# Patient Record
Sex: FEMALE | Race: WHITE | Employment: FULL TIME | URBAN - METROPOLITAN AREA
[De-identification: names, ages, dates, MRNs, and addresses within clinical notes are randomized per-mention and may not be internally consistent; named-entity substitution may affect disease eponyms.]

---

## 2017-08-18 ENCOUNTER — HOSPITAL ENCOUNTER (OUTPATIENT)
Dept: MAMMOGRAPHY | Age: 47
Discharge: HOME | End: 2017-08-18
Attending: FAMILY MEDICINE
Payer: COMMERCIAL

## 2017-08-18 DIAGNOSIS — Z12.31 ENCOUNTER FOR SCREENING MAMMOGRAM FOR BREAST CANCER: ICD-10-CM

## 2017-08-18 PROCEDURE — 77067 SCR MAMMO BI INCL CAD: CPT

## 2017-08-30 PROBLEM — R22.2 MASS ON BACK: Status: ACTIVE | Noted: 2017-08-30

## 2017-08-30 PROBLEM — R22.2 PALPABLE MASS OF LOWER BACK: Status: ACTIVE | Noted: 2017-08-30

## 2017-08-30 RX ORDER — VANCOMYCIN/0.9 % SOD CHLORIDE 1.5G/250ML
1500 PLASTIC BAG, INJECTION (ML) INTRAVENOUS
Status: CANCELLED | OUTPATIENT
Start: 2017-10-17

## 2017-10-13 RX ORDER — VANCOMYCIN 2 GRAM/500 ML IN 0.9 % SODIUM CHLORIDE INTRAVENOUS
2000 ONCE
Status: CANCELLED | OUTPATIENT
Start: 2017-10-17 | End: 2017-10-17

## 2017-12-11 ENCOUNTER — ANESTHESIA EVENT (OUTPATIENT)
Dept: SURGERY | Age: 47
End: 2017-12-11
Payer: COMMERCIAL

## 2017-12-11 RX ORDER — VANCOMYCIN/0.9 % SOD CHLORIDE 1.5G/250ML
1500 PLASTIC BAG, INJECTION (ML) INTRAVENOUS ONCE
Status: COMPLETED | OUTPATIENT
Start: 2017-12-12 | End: 2017-12-12

## 2017-12-11 NOTE — ANESTHESIA PREPROCEDURE EVALUATION
Anesthetic History               Review of Systems / Medical History  Patient summary reviewed and pertinent labs reviewed    Pulmonary        Sleep apnea: No treatment    Asthma : well controlled       Neuro/Psych              Cardiovascular  Within defined limits                Exercise tolerance: >4 METS     GI/Hepatic/Renal  Within defined limits              Endo/Other    Diabetes: well controlled, type 2    Morbid obesity     Other Findings            Physical Exam    Airway  Mallampati: II  TM Distance: 4 - 6 cm  Neck ROM: normal range of motion   Mouth opening: Normal     Cardiovascular  Regular rate and rhythm,  S1 and S2 normal,  no murmur, click, rub, or gallop             Dental  No notable dental hx       Pulmonary  Breath sounds clear to auscultation               Abdominal  GI exam deferred       Other Findings            Anesthetic Plan    ASA: 3  Anesthesia type: general          Induction: Intravenous  Anesthetic plan and risks discussed with: Spouse and Patient

## 2017-12-12 ENCOUNTER — ANESTHESIA (OUTPATIENT)
Dept: SURGERY | Age: 47
End: 2017-12-12
Payer: COMMERCIAL

## 2017-12-12 ENCOUNTER — HOSPITAL ENCOUNTER (OUTPATIENT)
Age: 47
Setting detail: OUTPATIENT SURGERY
Discharge: HOME | End: 2017-12-12
Attending: SURGERY | Admitting: SURGERY
Payer: COMMERCIAL

## 2017-12-12 VITALS
HEART RATE: 50 BPM | OXYGEN SATURATION: 96 % | DIASTOLIC BLOOD PRESSURE: 83 MMHG | TEMPERATURE: 98.6 F | RESPIRATION RATE: 18 BRPM | BODY MASS INDEX: 41.02 KG/M2 | HEIGHT: 71 IN | SYSTOLIC BLOOD PRESSURE: 134 MMHG | WEIGHT: 293 LBS

## 2017-12-12 LAB — GLUCOSE BLD STRIP.AUTO-MCNC: 93 MG/DL (ref 65–100)

## 2017-12-12 PROCEDURE — 77030008467 HC STPLR SKN COVD -B: Performed by: SURGERY

## 2017-12-12 PROCEDURE — 77030018836 HC SOL IRR NACL ICUM -A: Performed by: SURGERY

## 2017-12-12 PROCEDURE — 76210000020 HC REC RM PH II FIRST 0.5 HR: Performed by: SURGERY

## 2017-12-12 PROCEDURE — 76060000032 HC ANESTHESIA 0.5 TO 1 HR: Performed by: SURGERY

## 2017-12-12 PROCEDURE — 88305 TISSUE EXAM BY PATHOLOGIST: CPT | Performed by: SURGERY

## 2017-12-12 PROCEDURE — 74011250636 HC RX REV CODE- 250/636

## 2017-12-12 PROCEDURE — 74011250636 HC RX REV CODE- 250/636: Performed by: SURGERY

## 2017-12-12 PROCEDURE — 74011250637 HC RX REV CODE- 250/637: Performed by: ANESTHESIOLOGY

## 2017-12-12 PROCEDURE — 77030020782 HC GWN BAIR PAWS FLX 3M -B: Performed by: NURSE ANESTHETIST, CERTIFIED REGISTERED

## 2017-12-12 PROCEDURE — 77030003029 HC SUT VCRL J&J -B: Performed by: SURGERY

## 2017-12-12 PROCEDURE — 77030011640 HC PAD GRND REM COVD -A: Performed by: SURGERY

## 2017-12-12 PROCEDURE — 74011000250 HC RX REV CODE- 250: Performed by: SURGERY

## 2017-12-12 PROCEDURE — 82962 GLUCOSE BLOOD TEST: CPT

## 2017-12-12 PROCEDURE — 74011000250 HC RX REV CODE- 250

## 2017-12-12 PROCEDURE — 77030008703 HC TU ET UNCUF COVD -A: Performed by: NURSE ANESTHETIST, CERTIFIED REGISTERED

## 2017-12-12 PROCEDURE — 76010000138 HC OR TIME 0.5 TO 1 HR: Performed by: SURGERY

## 2017-12-12 PROCEDURE — 74011250636 HC RX REV CODE- 250/636: Performed by: ANESTHESIOLOGY

## 2017-12-12 PROCEDURE — 77030033269 HC SLV COMPR SCD KNE2 CARD -B: Performed by: SURGERY

## 2017-12-12 PROCEDURE — 76210000006 HC OR PH I REC 0.5 TO 1 HR: Performed by: SURGERY

## 2017-12-12 RX ORDER — MIDAZOLAM HYDROCHLORIDE 1 MG/ML
2 INJECTION, SOLUTION INTRAMUSCULAR; INTRAVENOUS
Status: DISCONTINUED | OUTPATIENT
Start: 2017-12-12 | End: 2017-12-12 | Stop reason: HOSPADM

## 2017-12-12 RX ORDER — FENTANYL CITRATE 50 UG/ML
INJECTION, SOLUTION INTRAMUSCULAR; INTRAVENOUS AS NEEDED
Status: DISCONTINUED | OUTPATIENT
Start: 2017-12-12 | End: 2017-12-12 | Stop reason: HOSPADM

## 2017-12-12 RX ORDER — ONDANSETRON 2 MG/ML
INJECTION INTRAMUSCULAR; INTRAVENOUS AS NEEDED
Status: DISCONTINUED | OUTPATIENT
Start: 2017-12-12 | End: 2017-12-12 | Stop reason: HOSPADM

## 2017-12-12 RX ORDER — LIDOCAINE HYDROCHLORIDE 20 MG/ML
INJECTION, SOLUTION EPIDURAL; INFILTRATION; INTRACAUDAL; PERINEURAL AS NEEDED
Status: DISCONTINUED | OUTPATIENT
Start: 2017-12-12 | End: 2017-12-12 | Stop reason: HOSPADM

## 2017-12-12 RX ORDER — OXYCODONE HYDROCHLORIDE 5 MG/1
5 TABLET ORAL
Status: DISCONTINUED | OUTPATIENT
Start: 2017-12-12 | End: 2017-12-12 | Stop reason: HOSPADM

## 2017-12-12 RX ORDER — DEXAMETHASONE SODIUM PHOSPHATE 4 MG/ML
INJECTION, SOLUTION INTRA-ARTICULAR; INTRALESIONAL; INTRAMUSCULAR; INTRAVENOUS; SOFT TISSUE AS NEEDED
Status: DISCONTINUED | OUTPATIENT
Start: 2017-12-12 | End: 2017-12-12 | Stop reason: HOSPADM

## 2017-12-12 RX ORDER — HYDROCODONE BITARTRATE AND ACETAMINOPHEN 5; 325 MG/1; MG/1
2 TABLET ORAL AS NEEDED
Status: DISCONTINUED | OUTPATIENT
Start: 2017-12-12 | End: 2017-12-12 | Stop reason: HOSPADM

## 2017-12-12 RX ORDER — LIDOCAINE HYDROCHLORIDE 10 MG/ML
0.1 INJECTION INFILTRATION; PERINEURAL AS NEEDED
Status: DISCONTINUED | OUTPATIENT
Start: 2017-12-12 | End: 2017-12-12 | Stop reason: HOSPADM

## 2017-12-12 RX ORDER — ROCURONIUM BROMIDE 10 MG/ML
INJECTION, SOLUTION INTRAVENOUS AS NEEDED
Status: DISCONTINUED | OUTPATIENT
Start: 2017-12-12 | End: 2017-12-12 | Stop reason: HOSPADM

## 2017-12-12 RX ORDER — GLYCOPYRROLATE 0.2 MG/ML
INJECTION INTRAMUSCULAR; INTRAVENOUS AS NEEDED
Status: DISCONTINUED | OUTPATIENT
Start: 2017-12-12 | End: 2017-12-12 | Stop reason: HOSPADM

## 2017-12-12 RX ORDER — NEOSTIGMINE METHYLSULFATE 1 MG/ML
INJECTION INTRAVENOUS AS NEEDED
Status: DISCONTINUED | OUTPATIENT
Start: 2017-12-12 | End: 2017-12-12 | Stop reason: HOSPADM

## 2017-12-12 RX ORDER — BUPIVACAINE HYDROCHLORIDE 2.5 MG/ML
INJECTION, SOLUTION EPIDURAL; INFILTRATION; INTRACAUDAL AS NEEDED
Status: DISCONTINUED | OUTPATIENT
Start: 2017-12-12 | End: 2017-12-12 | Stop reason: HOSPADM

## 2017-12-12 RX ORDER — PROPOFOL 10 MG/ML
INJECTION, EMULSION INTRAVENOUS AS NEEDED
Status: DISCONTINUED | OUTPATIENT
Start: 2017-12-12 | End: 2017-12-12 | Stop reason: HOSPADM

## 2017-12-12 RX ORDER — SODIUM CHLORIDE, SODIUM LACTATE, POTASSIUM CHLORIDE, CALCIUM CHLORIDE 600; 310; 30; 20 MG/100ML; MG/100ML; MG/100ML; MG/100ML
75 INJECTION, SOLUTION INTRAVENOUS CONTINUOUS
Status: DISCONTINUED | OUTPATIENT
Start: 2017-12-12 | End: 2017-12-12 | Stop reason: HOSPADM

## 2017-12-12 RX ORDER — HYDROMORPHONE HYDROCHLORIDE 2 MG/ML
0.5 INJECTION, SOLUTION INTRAMUSCULAR; INTRAVENOUS; SUBCUTANEOUS
Status: DISCONTINUED | OUTPATIENT
Start: 2017-12-12 | End: 2017-12-12 | Stop reason: HOSPADM

## 2017-12-12 RX ORDER — KETOROLAC TROMETHAMINE 30 MG/ML
INJECTION, SOLUTION INTRAMUSCULAR; INTRAVENOUS AS NEEDED
Status: DISCONTINUED | OUTPATIENT
Start: 2017-12-12 | End: 2017-12-12 | Stop reason: HOSPADM

## 2017-12-12 RX ORDER — MIDAZOLAM HYDROCHLORIDE 1 MG/ML
5 INJECTION, SOLUTION INTRAMUSCULAR; INTRAVENOUS ONCE
Status: DISCONTINUED | OUTPATIENT
Start: 2017-12-12 | End: 2017-12-12 | Stop reason: HOSPADM

## 2017-12-12 RX ORDER — FENTANYL CITRATE 50 UG/ML
100 INJECTION, SOLUTION INTRAMUSCULAR; INTRAVENOUS ONCE
Status: DISCONTINUED | OUTPATIENT
Start: 2017-12-12 | End: 2017-12-12 | Stop reason: HOSPADM

## 2017-12-12 RX ORDER — FAMOTIDINE 20 MG/1
20 TABLET, FILM COATED ORAL ONCE
Status: COMPLETED | OUTPATIENT
Start: 2017-12-12 | End: 2017-12-12

## 2017-12-12 RX ADMIN — KETOROLAC TROMETHAMINE 30 MG: 30 INJECTION, SOLUTION INTRAMUSCULAR; INTRAVENOUS at 07:37

## 2017-12-12 RX ADMIN — ONDANSETRON 4 MG: 2 INJECTION INTRAMUSCULAR; INTRAVENOUS at 07:37

## 2017-12-12 RX ADMIN — ROCURONIUM BROMIDE 35 MG: 10 INJECTION, SOLUTION INTRAVENOUS at 07:17

## 2017-12-12 RX ADMIN — PROPOFOL 200 MG: 10 INJECTION, EMULSION INTRAVENOUS at 07:17

## 2017-12-12 RX ADMIN — GLYCOPYRROLATE 0.3 MG: 0.2 INJECTION INTRAMUSCULAR; INTRAVENOUS at 07:51

## 2017-12-12 RX ADMIN — FENTANYL CITRATE 50 MCG: 50 INJECTION, SOLUTION INTRAMUSCULAR; INTRAVENOUS at 07:17

## 2017-12-12 RX ADMIN — LIDOCAINE HYDROCHLORIDE 100 MG: 20 INJECTION, SOLUTION EPIDURAL; INFILTRATION; INTRACAUDAL; PERINEURAL at 07:17

## 2017-12-12 RX ADMIN — FENTANYL CITRATE 50 MCG: 50 INJECTION, SOLUTION INTRAMUSCULAR; INTRAVENOUS at 07:30

## 2017-12-12 RX ADMIN — FAMOTIDINE 20 MG: 20 TABLET ORAL at 06:03

## 2017-12-12 RX ADMIN — DEXAMETHASONE SODIUM PHOSPHATE 4 MG: 4 INJECTION, SOLUTION INTRA-ARTICULAR; INTRALESIONAL; INTRAMUSCULAR; INTRAVENOUS; SOFT TISSUE at 07:37

## 2017-12-12 RX ADMIN — NEOSTIGMINE METHYLSULFATE 2 MG: 1 INJECTION INTRAVENOUS at 07:51

## 2017-12-12 RX ADMIN — SODIUM CHLORIDE, SODIUM LACTATE, POTASSIUM CHLORIDE, AND CALCIUM CHLORIDE 75 ML/HR: 600; 310; 30; 20 INJECTION, SOLUTION INTRAVENOUS at 06:00

## 2017-12-12 RX ADMIN — VANCOMYCIN HYDROCHLORIDE 1500 MG: 10 INJECTION, POWDER, LYOPHILIZED, FOR SOLUTION INTRAVENOUS at 06:00

## 2017-12-12 NOTE — DISCHARGE INSTRUCTIONS
Leave dressings 6-7 days. Then remove, but keep incision covered daily until follow-up. Try to keep incision as dry as possible to lower risk of infection. No heavy lifting (>5lbs) for 6 weeks to reduce risk of developing a hernia in the incisions. No driving until you are off pain meds for 24hrs and have no pain with movements associated with driving. After general anesthesia or intravenous sedation, for 24 hours or while taking prescription Narcotics:  · Limit your activities  · Do not drive and operate hazardous machinery  · Do not make important personal or business decisions  · Do  not drink alcoholic beverages  · If you have not urinated within 8 hours after discharge, please contact your surgeon on call. *  Please give a list of your current medications to your Primary Care Provider. *  Please update this list whenever your medications are discontinued, doses are      changed, or new medications (including over-the-counter products) are added. *  Please carry medication information at all times in case of emergency situations. These are general instructions for a healthy lifestyle:  No smoking/ No tobacco products/ Avoid exposure to second hand smoke  Surgeon General's Warning:  Quitting smoking now greatly reduces serious risk to your health. Obesity, smoking, and sedentary lifestyle greatly increases your risk for illness  A healthy diet, regular physical exercise & weight monitoring are important for maintaining a healthy lifestyle    You may be retaining fluid if you have a history of heart failure or if you experience any of the following symptoms:  Weight gain of 3 pounds or more overnight or 5 pounds in a week, increased swelling in our hands or feet or shortness of breath while lying flat in bed. Please call your doctor as soon as you notice any of these symptoms; do not wait until your next office visit.     Recognize signs and symptoms of STROKE:  F-face looks uneven  A-arms unable to move or move unevenly  S-speech slurred or non-existent  T-time-call 911 as soon as signs and symptoms begin-DO NOT go       Back to bed or wait to see if you get better-TIME IS BRAIN.     Follow-up with Dr Gaurav Cuenca on Thursday (12-) in the office at: 3:30 pm.  [facility]  , [address]  [phone]

## 2017-12-12 NOTE — BRIEF OP NOTE
BRIEF OPERATIVE NOTE    Date of Procedure:  12/12/2017    Preoperative Diagnosis: Benign tumor of back [D21.6]  Postoperative Diagnosis: same    Procedure:  Soft tissue mass excision form right upper back - 8cm  Surgeon(s) and Role:     * Dickson Padilla MD - Primary  Anesthesia: General  Estimated Blood Loss: <30cc  Specimens:   ID Type Source Tests Collected by Time Destination   1 : Right Upper Back Mass Preservative Back  Dickson Padilla MD 12/12/2017 6528 Pathology     Findings: see op note   Complications: none  Implants: * No implants in log *

## 2017-12-12 NOTE — IP AVS SNAPSHOT
[address] [address] 
[phone] Patient: Narciso Ghosh MRN: [id number] Yanet Ferreira About your hospitalization You were admitted on:  December 12, 2017 You last received care in the:  [facility] PACU You were discharged on:  December 12, 2017 Why you were hospitalized Your primary diagnosis was:  Not on File Things You Need To Do (next 8 weeks) Thursday Dec 21, 2017 Global Post Op with Dickson Padilla MD at  3:30 PM  
Where:  [facility] Discharge Orders None A check gary indicates which time of day the medication should be taken. My Medications TAKE these medications as instructed Instructions Each Dose to Equal  
 Morning Noon Evening Bedtime  
 albuterol 90 mcg/actuation inhaler Commonly known as:  PROAIR HFA Your last dose was: Your next dose is:    
   
   
 INHALE 2 TO 4 INHALATIONS EVERY 6 HOURS AS NEEDED FOR COUGH/WHEEZING/SOB  
     
   
   
   
  
 atorvastatin 10 mg tablet Commonly known as:  LIPITOR Your last dose was: Your next dose is: Take 1 Tab by mouth daily. 10 mg  
    
   
   
   
  
 dicyclomine 10 mg capsule Commonly known as:  BENTYL Your last dose was: Your next dose is:    
   
   
 1-2 po QID (before meals and bedtime) prn abdominal pain/ cramping  
     
   
   
   
  
 estradiol 0.5 mg tablet Commonly known as:  ESTRACE Your last dose was: Your next dose is: Take 1 Tab by mouth daily. 0.5 mg  
    
   
   
   
  
 fluticasone 50 mcg/actuation nasal spray Commonly known as:  [place] Willis Your last dose was: Your next dose is: 2 Sprays by Both Nostrils route daily. 2 Spray  
    
   
   
   
  
 fluticasone-vilanterol 100-25 mcg/dose inhaler Commonly known as:  BREO ELLIPTA Your last dose was: Your next dose is: Take 1 Puff by inhalation daily. 1 Puff  
    
   
   
   
  
 levocetirizine 5 mg tablet Commonly known as:  Lo Haley Your last dose was: Your next dose is: Take 1 Tab by mouth daily. Dx:J30.89.9 - failed allegra, claritin, zyrtec and flonase 5 mg  
    
   
   
   
  
 melatonin 1 mg tablet Your last dose was: Your next dose is: Take 1 mg by mouth nightly. Take 2 by mouth at bedtime. 1 mg  
    
   
   
   
  
 metFORMIN  mg tablet Commonly known as:  glucophage XR Your last dose was: Your next dose is:    
   
   
 2 po QD  
     
   
   
   
  
 nystatin powder Commonly known as:  NYSTOP Your last dose was: Your next dose is:    
   
   
 Apply  to affected area four (4) times daily. OTHER(NON-FORMULARY) Your last dose was: Your next dose is: CPAP mask hose and supplies - use nightly  DX: MATILDA Discharge Instructions Leave dressings 6-7 days. Then remove, but keep incision covered daily until follow-up. Try to keep incision as dry as possible to lower risk of infection. No heavy lifting (>5lbs) for 6 weeks to reduce risk of developing a hernia in the incisions. No driving until you are off pain meds for 24hrs and have no pain with movements associated with driving. After general anesthesia or intravenous sedation, for 24 hours or while taking prescription Narcotics: · Limit your activities · Do not drive and operate hazardous machinery · Do not make important personal or business decisions · Do  not drink alcoholic beverages · If you have not urinated within 8 hours after discharge, please contact your surgeon on call. *  Please give a list of your current medications to your Primary Care Provider. *  Please update this list whenever your medications are discontinued, doses are 
    changed, or new medications (including over-the-counter products) are added. *  Please carry medication information at all times in case of emergency situations. These are general instructions for a healthy lifestyle: No smoking/ No tobacco products/ Avoid exposure to second hand smoke Surgeon General's Warning:  Quitting smoking now greatly reduces serious risk to your health. Obesity, smoking, and sedentary lifestyle greatly increases your risk for illness A healthy diet, regular physical exercise & weight monitoring are important for maintaining a healthy lifestyle You may be retaining fluid if you have a history of heart failure or if you experience any of the following symptoms:  Weight gain of 3 pounds or more overnight or 5 pounds in a week, increased swelling in our hands or feet or shortness of breath while lying flat in bed. Please call your doctor as soon as you notice any of these symptoms; do not wait until your next office visit. Recognize signs and symptoms of STROKE: 
F-face looks uneven A-arms unable to move or move unevenly S-speech slurred or non-existent T-time-call 911 as soon as signs and symptoms begin-DO NOT go Back to bed or wait to see if you get better-TIME IS BRAIN. Follow-up with Dr Deanne Culver on Thursday (12-) in the office at: 3:30 pm. 
[facility] [address], [address] 
[phone] Introducing [facility] SERVICES! Dear Nohemy Sainz: 
Thank you for requesting a [employer] account. Our records indicate that you already have an active [employer] account. You can access your account anytime at [email or web address] Did you know that you can access your hospital and ER discharge instructions at any time in [employer]? You can also review all of your test results from your hospital stay or ER visit. Additional Information If you have questions, please visit the Frequently Asked Questions section of the [employer] website at [email or web address] Remember, MyChart is NOT to be used for urgent needs. For medical emergencies, dial 911. Now available from your iPhone and Android! Providers Seen During Your Hospitalization Provider Specialty Primary office phone Meaghan Serrano MD General Surgery [phone] Your Primary Care Physician (PCP) Primary Care Physician Office Phone Office Fax Esmeralda Schlatter [phone] You are allergic to the following Allergen Reactions Cephalexin Rash Recent Documentation Height Weight BMI OB Status Smoking Status 1.803 m 149.3 kg 45.91 kg/m2 Hysterectomy Former Smoker Emergency Contacts Name Discharge Info Relation Home Work Mobile Day,Volodymyr  Spouse [3] [phone] Patient Belongings The following personal items are in your possession at time of discharge: 
  Dental Appliances: None  Visual Aid: Glasses      Home Medications: None   Jewelry: Necklace  Clothing: Shirt, Pants, Footwear    Other Valuables: Eyeglasses Please provide this summary of care documentation to your next provider. Signatures-by signing, you are acknowledging that this After Visit Summary has been reviewed with you and you have received a copy. Patient Signature:  ____________________________________________________________ Date:  ____________________________________________________________  
  
Gail Sylvia Provider Signature:  ____________________________________________________________ Date:  ____________________________________________________________

## 2017-12-12 NOTE — H&P
H&P/Consult Note/Progress Note/Office Note:   Quincy Ruff  MRN: [id number]  :1970  Age:47 y.o.    HPI: Quincy Ruff is a 56 y.o. female who was referred for evaluation of soft tissue masses of her back. She reports a large, constant, growing soft tissue mass of her right upper back over many months and wants it removed. She reports no associated skin drainage. Nothing seems to make it better or worse. She has no prior infections of the area. She also reports a  unrelated mass of her right mid-lower back which is mall and hard to feel. It causes her a lot of pain and she also wants it removed. It is better when her  massages it an She has no h/o sarcoma. She has no associated weight loss. Past Medical History:   Diagnosis Date    Abnormal weight gain      44.7    Allergic rhinitis, cause unspecified 2013    Asthma     inhalers daily and prn    Diabetes ([address]     pre-diabetic- checks blood sugar twice a month - varies.  metformin    Dysmetabolic syndrome X 1148    Health care maintenance 2015    Colonoscopy -   na   Hemoccult cards - na   Pap smear -  s/p hysterectomy   DEXA scan - na Mammogram - 10/13 EKG -  Na Chest x-ray -  na Spirometry -  na HIV -   Hepatitis C -   Tetanus shot -  CPE  - 2015         Hypercholesteremia     Metabolic syndrome     Migraine     Miscarriage     Nausea alone 2012    Other malaise and fatigue 2012    Salpingo-oophoritis following molar or ectopic pregnancy     Shortness of breath 2012    on exertion r/t asthma    Sleep apnea     no c pap at present time    Spasm of muscle 2012     Past Surgical History:   Procedure Laterality Date    HX DARLEEN AND BSO  2006    Dr. Vaughn Alexander  as child     Current Facility-Administered Medications   Medication Dose Route Frequency    lidocaine (XYLOCAINE) 10 mg/mL (1 %) injection 0.1 mL  0.1 mL SubCUTAneous PRN    lactated Ringers infusion  75 mL/hr IntraVENous CONTINUOUS    fentaNYL citrate (PF) injection 100 mcg  100 mcg IntraVENous ONCE    midazolam (VERSED) injection 2 mg  2 mg IntraVENous ONCE PRN    midazolam (VERSED) injection 5 mg  5 mg IntraVENous ONCE    vancomycin (VANCOCIN) 1500 mg in  ml infusion  1,500 mg IntraVENous ONCE     Cephalexin  Social History     Social History    Marital status:      Spouse name: N/A    Number of children: N/A    Years of education: N/A     Occupational History     - [employer]     Social History Main Topics    Smoking status: Former Smoker     Packs/day: 0.20     Years: 4.00     Types: Cigarettes     Quit date: 1996    Smokeless tobacco: Never Used    Alcohol use 1.0 oz/week     2 Glasses of wine per week      Comment: 2 glasses / month    Drug use: No    Sexual activity: Not on file     Other Topics Concern    Blood Transfusions No    Caffeine Concern No     2 cups coffee, unsweet tea    Exercise Yes     walking    Seat Belt Yes     Social History Narrative     History   Smoking Status    Former Smoker    Packs/day: 0.20    Years: 4.00    Types: Cigarettes    Quit date: 1996   Smokeless Tobacco    Never Used     Family History   Problem Relation Age of Onset    Hypertension Brother     High Cholesterol Brother     Psychiatric Disorder Brother      Bi-Polar    Diabetes Mother     Diabetes Father      MI    Hypertension Father     Other Father      dvt    Heart Disease Father     Breast Cancer Neg Hx      ROS: The patient has no difficulty with chest pain or shortness of breath. No fever or chills. Comprehensive review of systems was otherwise unremarkable except as noted above.     Physical Exam:   Visit Vitals    /80 (BP 1 Location: Right arm, BP Patient Position: At rest)    Pulse 79    Temp 98.5 °F (36.9 °C)    Resp 18    Ht 5' 11\" (1.803 m)    Wt 329 lb 3 oz (149.3 kg)    SpO2 97%    BMI 45.91 kg/m2 Constitutional: Alert, oriented, cooperative patient in no acute distress; appears stated age    Eyes:Sclera are clear. EOMs intact  ENMT: no external lesions gross hearing normal; no obvious neck masses, no ear or lip lesions, nares normal  CV: RRR. Normal perfusion  Resp: No JVD. Breathing is  non-labored; no audible wheezing. GI: soft and non-distended    Large soft tissue mass of right upper back  We cannot palpate or find a second mass on 12/12/17  Prior in the office a possible smaller and difficult to feel nodular and tender soft tissue mass of right mid-lower back   Musculoskeletal: unremarkable with normal function. No embolic signs or cyanosis. Neuro:  Oriented; moves all 4; no focal deficits  Psychiatric: normal affect and mood, no memory impairment    Recent vitals (if inpt):  @IPVITALS(24:)@    Labs:  No results for input(s): WBC, HGB, PLT, NA, K, CL, CO2, BUN, CREA, GLU, PTP, INR, APTT, TBIL, TBILI, CBIL, SGOT, GPT, ALT, AP, AML, LPSE, LCAD, NH4, TROPT, TROIQ, PCO2, PO2, HCO3, HGBEXT, PLTEXT, HGBEXT, PLTEXT in the last 72 hours. No lab exists for component:  PH, INREXT, INREXT    Lab Results   Component Value Date/Time    WBC 6.4 06/22/2017 10:46 AM    HGB 14.5 06/22/2017 10:46 AM    PLATELET 009 70/28/8456 10:46 AM    Sodium 138 06/22/2017 10:46 AM    Potassium 4.9 06/22/2017 10:46 AM    Chloride 99 06/22/2017 10:46 AM    CO2 24 06/22/2017 10:46 AM    BUN 12 06/22/2017 10:46 AM    Creatinine 1.09 06/22/2017 10:46 AM    Glucose 92 06/22/2017 10:46 AM    Bilirubin, total 0.4 06/22/2017 10:46 AM    AST (SGOT) 24 06/22/2017 10:46 AM    ALT (SGPT) 30 06/22/2017 10:46 AM    Alk. phosphatase 102 06/22/2017 10:46 AM       I reviewed recent labs and recent radiologic studies. I independently reviewed radiology images for studies I described above or studies I have ordered.    Admission date (for inpatients): 12/12/2017   [unfilled]  [unfilled]    ASSESSMENT/PLAN:  Problem List  Date Reviewed: 8/30/2017          Codes Class Noted    Mass on back ICD-10-CM: R22.2  ICD-9-CM: 782.2  8/30/2017        Palpable mass of right mid-lower back ICD-10-CM: R22.2  ICD-9-CM: 782.2  8/30/2017        Chronic venous insufficiency ICD-10-CM: I87.2  ICD-9-CM: 459.81  4/20/2016    Overview Signed 6/22/2017  9:47 AM by Maximo Reese MD     Last Assessment & Plan:   See plan above under thrombophlebitis superficial veins of right lower extremity             Thrombophlebitis of superficial veins of right lower extremity ICD-10-CM: I80.01  ICD-9-CM: 451.0  4/20/2016    Overview Signed 6/22/2017  9:47 AM by Maximo Reese MD     Last Assessment & Plan:   Right calf-resolved  Duplex ultrasound, reflux, protocol demonstrates GSV incompetence below the knee. Tributary vein feeding the posterior lateral calf branches from the distal thigh GSV where the GSV is competent. Procedural intervention directed at the great saphenous vein is not recommended. Sclerotherapy is offered for the tributary vein and telangiectasias bilateral calf. She understands insurance may not consider this medically necessary. However, at this time she is improved and does not want to pursue injections. She was switched from a thigh high to a knee-high, class I. New prescription given. She'll continue to elevate her leg.  She will start a walking program. Horse chestnut seed recommended  Return to clinic in 6 months             Health care maintenance (Chronic) ICD-10-CM: Z00.00  ICD-9-CM: V70.0  2/4/2015    Overview Addendum 6/27/2017  2:02 PM by Maximo Reese MD     Colonoscopy -   na   Hemoccult cards - na   Pap smear -  s/p hysterectomy   DEXA scan - na Mammogram - 2015  EKG -  3/3016 Chest x-ray - 3/3016  na Spirometry -  2015 HIV -   Hepatitis C -   Tetanus shot - 9/12 CPE  - 2/4/2015     Eye MD - [facility]             Diabetes mellitus without complication ([facility] NGX-16-ZM: E11.9  ICD-9-CM: 250.00  6/26/2013        Allergic rhinitis ICD-10-CM: J30.9  ICD-9-CM: 477.9  5/21/2013        Shortness of breath (Chronic) ICD-10-CM: R06.02  ICD-9-CM: 786.05  9/27/2012        Fatigue (Chronic) ICD-10-CM: G63.81  ICD-9-CM: 780.79  9/27/2012    Overview Signed 1/26/2013  1:51 PM by Anna Estrella MD     The patient is stable on the current treatment. Tolerating well. No sides effects. Will not adjust at this time. Abnormal weight gain (Chronic) ICD-10-CM: R63.5  ICD-9-CM: 783.1  9/27/2012        Dysmetabolic syndrome X (Chronic) ICD-10-CM: N21.43  ICD-9-CM: 277.7  9/27/2012    Overview Signed 1/26/2013  1:52 PM by Anna Estrella MD     The patient is stable on the current treatment. Tolerating well. No sides effects. Will not adjust at this time. Disturbance in sleep behavior (Chronic) ICD-10-CM: G47.9  ICD-9-CM: 780.50  9/27/2012    Overview Signed 1/26/2013  1:51 PM by Anna Estrella MD     The patient is stable on the current treatment. Tolerating well. No sides effects. Will not adjust at this time. Spasm of muscle ICD-10-CM: S03.015  ICD-9-CM: 728.85  9/27/2012        Headache(784.0) (Chronic) ICD-10-CM: R51  ICD-9-CM: 784.0  9/27/2012    Overview Signed 1/26/2013  1:51 PM by Anna Estrella MD     The patient is stable on the current treatment. Tolerating well. No sides effects. Will not adjust at this time. Nausea alone ICD-10-CM: R11.0  ICD-9-CM: 787.02  9/27/2012        Acute sinusitis, unspecified ICD-10-CM: J01.90  ICD-9-CM: 461.9  9/27/2012            Active Problems:    * No active hospital problems. *     Informed consent obtained to excise the upper back mass and possibly a right paralumbar mass if it can be found with her prone (It was niot palpable to me, her , or patient in pre-op just before surgery).       Signed:  Julian Major MD,  FACS

## 2017-12-12 NOTE — ANESTHESIA POSTPROCEDURE EVALUATION
Post-Anesthesia Evaluation and Assessment    Patient: Xochilt Ruff MRN: [id number]  SSN: [id number]    YOB: 1970  Age: 56 y.o. Sex: female       Cardiovascular Function/Vital Signs  Visit Vitals    /84    Pulse (!) 53    Temp 36.6 °C (97.8 °F)    Resp 18    Ht 5' 11\" (1.803 m)    Wt 149.3 kg (329 lb 3 oz)    SpO2 96%    BMI 45.91 kg/m2       Patient is status post general anesthesia for Procedure(s):  EXCISION  X 2 OF BACK SOFT TISSUE MASS / PRONE POSITION. Nausea/Vomiting: None    Postoperative hydration reviewed and adequate. Pain:  Pain Scale 1: Visual (12/12/17 0803)  Pain Intensity 1: 0 (12/12/17 0803)   Managed    Neurological Status:   Neuro (WDL): Within Defined Limits (12/12/17 0803)   At baseline    Mental Status and Level of Consciousness: Arousable    Pulmonary Status:   O2 Device: Nasal cannula (12/12/17 0805)   Adequate oxygenation and airway patent    Complications related to anesthesia: None    Post-anesthesia assessment completed.  No concerns    Signed By: Denice Kirk MD     December 12, 2017

## 2017-12-19 NOTE — OP NOTES
[address] [facility]  OPERATIVE REPORT    Anna Gregg  MR#: [id number]  : 1970  ACCOUNT #: [de-identified]   DATE OF SERVICE: 2017    PREOPERATIVE DIAGNOSIS:  Soft tissue mass of upper back. POSTOPERATIVE DIAGNOSIS:  Soft tissue mass of upper back. PROCEDURES PERFORMED:  Excision of soft tissue mass of the upper back. SURGEON:  KUNAL Armas:  None. ANESTHESIA:  General.    COMPLICATIONS:  None. ESTIMATED BLOOD LOSS:  Less than 20 mL. SPECIMENS REMOVED:  The specimen was sent to Pathology for review. IMPLANTS:  None. DESCRIPTION OF PROCEDURE: This patient was taken to the operating room and placed in the supine position. After adequate anesthesia, the patient was placed prone. Her upper back was prepped and draped in a sterile fashion with multiple layers of Betadine scrub and Betadine solution. A longitudinal incision was made over the upper back mass. It was circumferentially excised completely from the subcutaneous space. It was about 8 cm in size. It was sent to Pathology for review. Irrigation was used. Hemostasis was confirmed. The biopsy cavity was palpated and inspected, and there was no evidence of residual disease. Interrupted deep dermal 3-0 Vicryl sutures were placed. The skin was closed with clips. A sterile dressing was placed. The patient tolerated the procedure well.       MD MARIA ELENA Ramirez / [place]  D: 12/15/2017 23:19     T: 2017 13:43  JOB #: [id number]

## 2017-12-21 PROBLEM — E66.01 OBESITY, MORBID (HCC): Status: ACTIVE | Noted: 2017-12-21

## 2019-01-31 ENCOUNTER — HOSPITAL ENCOUNTER (OUTPATIENT)
Dept: MAMMOGRAPHY | Age: 49
Discharge: HOME | End: 2019-01-31
Attending: FAMILY MEDICINE
Payer: COMMERCIAL

## 2019-01-31 DIAGNOSIS — Z12.39 SCREENING BREAST EXAMINATION: ICD-10-CM

## 2019-01-31 PROCEDURE — 77067 SCR MAMMO BI INCL CAD: CPT

## 2021-03-26 ENCOUNTER — HOSPITAL ENCOUNTER (OUTPATIENT)
Dept: MAMMOGRAPHY | Age: 51
Discharge: HOME | End: 2021-03-26

## 2021-03-26 DIAGNOSIS — Z12.31 VISIT FOR SCREENING MAMMOGRAM: ICD-10-CM

## 2022-03-19 PROBLEM — E66.01 OBESITY, MORBID (HCC): Status: ACTIVE | Noted: 2017-12-21

## 2022-03-19 PROBLEM — R22.2 MASS ON BACK: Status: ACTIVE | Noted: 2017-08-30

## 2022-06-22 ENCOUNTER — OFFICE VISIT (OUTPATIENT)
Dept: FAMILY MEDICINE CLINIC | Facility: CLINIC | Age: 52
End: 2022-06-22
Payer: COMMERCIAL

## 2022-06-22 VITALS
HEIGHT: 61 IN | OXYGEN SATURATION: 98 % | DIASTOLIC BLOOD PRESSURE: 80 MMHG | TEMPERATURE: 98.4 F | BODY MASS INDEX: 55.32 KG/M2 | RESPIRATION RATE: 16 BRPM | HEART RATE: 64 BPM | WEIGHT: 293 LBS | SYSTOLIC BLOOD PRESSURE: 126 MMHG

## 2022-06-22 DIAGNOSIS — E55.9 VITAMIN D DEFICIENCY: ICD-10-CM

## 2022-06-22 DIAGNOSIS — R63.5 WEIGHT GAIN: ICD-10-CM

## 2022-06-22 DIAGNOSIS — E66.01 OBESITY, MORBID (HCC): ICD-10-CM

## 2022-06-22 DIAGNOSIS — I10 ESSENTIAL HYPERTENSION: ICD-10-CM

## 2022-06-22 DIAGNOSIS — F41.1 GAD (GENERALIZED ANXIETY DISORDER): ICD-10-CM

## 2022-06-22 DIAGNOSIS — E11.9 DIABETES MELLITUS WITHOUT COMPLICATION (HCC): ICD-10-CM

## 2022-06-22 DIAGNOSIS — R53.83 FATIGUE, UNSPECIFIED TYPE: ICD-10-CM

## 2022-06-22 DIAGNOSIS — E88.81 DYSMETABOLIC SYNDROME X: ICD-10-CM

## 2022-06-22 DIAGNOSIS — Z23 NEED FOR SHINGLES VACCINE: ICD-10-CM

## 2022-06-22 DIAGNOSIS — N95.1 MENOPAUSAL STATE: ICD-10-CM

## 2022-06-22 DIAGNOSIS — Z12.11 SCREEN FOR COLON CANCER: ICD-10-CM

## 2022-06-22 DIAGNOSIS — M17.11 ARTHRITIS OF RIGHT KNEE: ICD-10-CM

## 2022-06-22 DIAGNOSIS — M25.552 LEFT HIP PAIN: ICD-10-CM

## 2022-06-22 DIAGNOSIS — E78.5 DYSLIPIDEMIA: ICD-10-CM

## 2022-06-22 DIAGNOSIS — R63.5 ABNORMAL WEIGHT GAIN: ICD-10-CM

## 2022-06-22 DIAGNOSIS — Z00.00 LABORATORY EXAM ORDERED AS PART OF ROUTINE GENERAL MEDICAL EXAMINATION: ICD-10-CM

## 2022-06-22 DIAGNOSIS — M79.18 LEFT BUTTOCK PAIN: ICD-10-CM

## 2022-06-22 DIAGNOSIS — J30.1 NON-SEASONAL ALLERGIC RHINITIS DUE TO POLLEN: ICD-10-CM

## 2022-06-22 DIAGNOSIS — Z00.00 PHYSICAL EXAM: Primary | ICD-10-CM

## 2022-06-22 LAB
BILIRUBIN, URINE, POC: NEGATIVE
BLOOD URINE, POC: NEGATIVE
CHOLEST SERPL-MCNC: 145 MG/DL
GLUCOSE URINE, POC: NEGATIVE
HDLC SERPL-MCNC: 48 MG/DL (ref 40–60)
HDLC SERPL: 3 {RATIO}
KETONES, URINE, POC: NEGATIVE
LDLC SERPL CALC-MCNC: 73 MG/DL
LEUKOCYTE ESTERASE, URINE, POC: NEGATIVE
NITRITE, URINE, POC: NEGATIVE
PH, URINE, POC: 6 (ref 4.6–8)
PROTEIN,URINE, POC: NEGATIVE
SPECIFIC GRAVITY, URINE, POC: 1 (ref 1–1.03)
TRIGL SERPL-MCNC: 120 MG/DL (ref 35–150)
TSH, 3RD GENERATION: 1.87 UIU/ML (ref 0.36–3.74)
URINALYSIS CLARITY, POC: CLEAR
URINALYSIS COLOR, POC: YELLOW
UROBILINOGEN, POC: 0.2
VLDLC SERPL CALC-MCNC: 24 MG/DL (ref 6–23)

## 2022-06-22 PROCEDURE — 99396 PREV VISIT EST AGE 40-64: CPT | Performed by: FAMILY MEDICINE

## 2022-06-22 PROCEDURE — 81003 URINALYSIS AUTO W/O SCOPE: CPT | Performed by: FAMILY MEDICINE

## 2022-06-22 PROCEDURE — 99214 OFFICE O/P EST MOD 30 MIN: CPT | Performed by: FAMILY MEDICINE

## 2022-06-22 RX ORDER — ATORVASTATIN CALCIUM 10 MG/1
TABLET, FILM COATED ORAL
COMMUNITY
Start: 2022-05-05 | End: 2022-06-22 | Stop reason: SDUPTHER

## 2022-06-22 RX ORDER — LEVOCETIRIZINE DIHYDROCHLORIDE 5 MG/1
5 TABLET, FILM COATED ORAL EVERY EVENING
COMMUNITY

## 2022-06-22 RX ORDER — FLUTICASONE PROPIONATE 50 MCG
1 SPRAY, SUSPENSION (ML) NASAL DAILY
COMMUNITY

## 2022-06-22 RX ORDER — FAMOTIDINE 20 MG/1
TABLET, FILM COATED ORAL
COMMUNITY

## 2022-06-22 RX ORDER — ESTRADIOL 0.5 MG/1
TABLET ORAL
COMMUNITY
Start: 2022-05-05 | End: 2022-06-22 | Stop reason: SDUPTHER

## 2022-06-22 RX ORDER — FLUTICASONE FUROATE AND VILANTEROL 100; 25 UG/1; UG/1
1 POWDER RESPIRATORY (INHALATION) DAILY
COMMUNITY

## 2022-06-22 RX ORDER — FLUOXETINE HYDROCHLORIDE 20 MG/1
20 CAPSULE ORAL DAILY
Qty: 90 CAPSULE | Refills: 1 | Status: SHIPPED | OUTPATIENT
Start: 2022-06-22

## 2022-06-22 RX ORDER — ZOSTER VACCINE RECOMBINANT, ADJUVANTED 50 MCG/0.5
0.5 KIT INTRAMUSCULAR SEE ADMIN INSTRUCTIONS
Qty: 0.5 ML | Refills: 1 | Status: SHIPPED | OUTPATIENT
Start: 2022-06-22 | End: 2022-12-19

## 2022-06-22 RX ORDER — ESTRADIOL 0.5 MG/1
TABLET ORAL
Qty: 90 TABLET | Refills: 3 | Status: SHIPPED | OUTPATIENT
Start: 2022-06-22

## 2022-06-22 RX ORDER — DICLOFENAC SODIUM 75 MG/1
TABLET, DELAYED RELEASE ORAL
Qty: 180 TABLET | Refills: 1 | Status: SHIPPED | OUTPATIENT
Start: 2022-06-22

## 2022-06-22 RX ORDER — MELATONIN 10 MG
CAPSULE ORAL
COMMUNITY

## 2022-06-22 RX ORDER — BISOPROLOL FUMARATE AND HYDROCHLOROTHIAZIDE 5; 6.25 MG/1; MG/1
TABLET ORAL
Qty: 90 TABLET | Refills: 1 | Status: SHIPPED | OUTPATIENT
Start: 2022-06-22

## 2022-06-22 RX ORDER — ATORVASTATIN CALCIUM 10 MG/1
TABLET, FILM COATED ORAL
Qty: 90 TABLET | Refills: 1 | Status: SHIPPED | OUTPATIENT
Start: 2022-06-22

## 2022-06-22 RX ORDER — ALBUTEROL SULFATE 90 UG/1
AEROSOL, METERED RESPIRATORY (INHALATION) EVERY 4 HOURS PRN
COMMUNITY

## 2022-06-22 RX ORDER — BISOPROLOL FUMARATE AND HYDROCHLOROTHIAZIDE 5; 6.25 MG/1; MG/1
TABLET ORAL
COMMUNITY
Start: 2022-05-05 | End: 2022-06-22 | Stop reason: SDUPTHER

## 2022-06-22 RX ORDER — PHENDIMETRAZINE TARTRATE 35 MG/1
TABLET ORAL
Qty: 42 TABLET | Refills: 0
Start: 2022-06-22 | End: 2022-09-05 | Stop reason: SDUPTHER

## 2022-06-22 ASSESSMENT — PATIENT HEALTH QUESTIONNAIRE - PHQ9
SUM OF ALL RESPONSES TO PHQ QUESTIONS 1-9: 0
SUM OF ALL RESPONSES TO PHQ QUESTIONS 1-9: 0
1. LITTLE INTEREST OR PLEASURE IN DOING THINGS: 0
SUM OF ALL RESPONSES TO PHQ QUESTIONS 1-9: 0
SUM OF ALL RESPONSES TO PHQ QUESTIONS 1-9: 0
2. FEELING DOWN, DEPRESSED OR HOPELESS: 0
SUM OF ALL RESPONSES TO PHQ9 QUESTIONS 1 & 2: 0

## 2022-06-22 NOTE — PROGRESS NOTES
HISTORY OF PRESENT ILLNESS  Chief Complaint   Patient presents with    Annual Exam     does not see gyn; FULL hysterectomy    Hip Pain     left hip; fell a few years ago and now this is starting to hurt daily      Fell at least 2 years ago when slipped on a deck and landed on the left hip. Was hurting every once on awhile and now hurting all the time over the last few weeks. Hurts from the left buttocks around to the medial left thigh  Tried biofreeze voltaren gel otc med every day  Ice  Some days are worse than others  If goes to twist will get a sharp pain. Trying to move around more in the daytime. Does get stiff when sitting. Has gained some weight over the last year. 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 Brother not helping. Distracted all the time by this. Has arthritis in the right knee. Has had injections in that knee before    Has been going to the weight loss clinic. Was on the adipex ubtil it stopped working. Diabetes Mellitus: Patient presents for follow up of diabetes. Symptoms: taking medications as instructed, no medication side effects noted, no TIA's, no chest pain on exertion, no dyspnea on exertion, no swelling of ankles. Symptoms have asymptomatic. Patient denies polydipsia and polyuria. Evaluation to date has been included below. Home sugars: BGs consistently in an acceptable range. Treatment to date: medications. Diabetic issues reviewed with her: low cholesterol diet, weight control and daily exercise discussed. Key Antihyperglycemic Medications     Patient is on no antihyperglycemic meds.          Lab Results   Component Value Date    LABA1C 5.4 06/22/2022     Lab Results   Component Value Date     06/22/2022      Lab Results   Component Value Date    CREATININE 0.90 06/22/2022      Lab Results   Component Value Date    LABMICR <0.50 06/22/2022      Wt Readings from Last 3 Encounters:   06/22/22 (!) 319 lb 3.2 oz (144.8 kg)   04/12/21 (!) 301 lb (136.5 kg)      BP Readings from Last 3 Encounters:   06/22/22 126/80   04/12/21 116/78        Hip Pain   The incident occurred more than 1 week ago. The injury mechanism was a fall. The pain is present in the left hip. The quality of the pain is described as aching and shooting. The pain is at a severity of 6/10. The pain has been fluctuating since onset. Pertinent negatives include no inability to bear weight, loss of motion, loss of sensation, muscle weakness, numbness or tingling. The symptoms are aggravated by movement and weight bearing. She has tried ice, NSAIDs, rest and acetaminophen for the symptoms. The treatment provided mild relief. Headache   Associated symptoms include headaches. Pertinent negatives include no chest pain, no abdominal pain and no shortness of breath. Cholesterol Problem   The history is provided by the patient. This is a chronic problem. The current episode started more than 1 week ago. The problem occurs daily. The problem has not changed since onset. Associated symptoms include headaches. Pertinent negatives include no chest pain, no abdominal pain and no shortness of breath. The symptoms are aggravated by eating. The symptoms are relieved by medications and walking. The treatment provided moderate relief. Review of Systems   Neurological: Negative for tingling and numbness. Complete Review Of Systems (Female)  General:  Normal Activity and decreased Energy Levels.    No change in appetite.  complains of  some gain or loss in weight. complains of Fatigue - stress related, no malaise, fever, chills or sweats. Eyes:           no complain of  changes in vision.  No eye problems.  Eye exam up to date - appt 7/22.  Wears glasses/ contacts.   ENT:   complaints of chronic sinus congestion or pain.  No nose bleeds, no complains of  hearing problems, tinnitis, hoarseness, or trouble swallowing.  No gum or teeth problems.  Regular dental care. Respiratory:           No recent URI.  no complains of coughing, wheezing, or occasionally shortness of breath.  No sputum.  No hemoptysis. Cardiac:  no chest pain  occasionally palpitations with anxiety orthopnea, no complains of dyspnea on exertion.  complains of  Edema - ankles and feet.  No varicose veins. GI:  No abdominal pain, nausea, vomiting, bloating, complains of diarrhea, constipation, blood, or change in bowels.    :  No frequency, urgency, dysuria,  or hematuria.  No Leakage or incontinence.  No sexual dysfunction complains of change in libido.  Normal menstrual history/  Surgical menopause. Peter Balder unusual vaginal bleeding,  discharge, or odor.  No pelvic pain. Musculoskeletal:  No muscle or joint pain.  No Injury.  No weakness, swelling or inflammation.  No decreased in ROM, muscle atrophy, or back pain.    See hpi - knee pain   Neuro:             No numbness, tingling, weakness, dizziness, tremor, or complains of headaches.  No Loss of consciousness or seizures.  No transient ischemic symptoms.  Still rarely migranes. Skin/ Breast:         no rash or non-healing skin lesions.  No breast pain, discharge or masses.  dry skin  Psych:  No sleep issues.  No increase in nervousness, mood swings, complains of  irritability or depression.  Coping well.  No thoughts of suicide or homicide. Using melatonin 4-5 times a week on work nites. Endocrine:  No trouble with excessive hunger or thirst or urination.  complains of heat and cold intolerance.  No thyroid issues.  No hot flashes. Heme/Lymphatic:  No anemia.  complains of  easy bruising or bleeding.  No enlarged or tender nodes. Allergy/ Immunologic: complains of  allergies to environmental or food substances.  No seasonal rhinitis.  No chronic problems with immune system. See HPI for further details.     Past Medical History:   Diagnosis Date    Abnormal weight gain      44.7    Allergic rhinitis, cause unspecified 2013    Arthritis     Asthma     inhalers daily and prn    Diabetes ([address]     pre-diabetic- checks blood sugar twice a month - varies.  metformin    Dysmetabolic syndrome X     Health care maintenance 2015    Colonoscopy -   na   Hemoccult cards - na   Pap smear -  s/p hysterectomy   DEXA scan ? na Mammogram ? 10/13 EKG -  Na Chest x-ray -  na Spirometry -  na HIV -   Hepatitis C -   Tetanus shot -  CPE  - 2015         Hypercholesteremia     Menopause     Metabolic syndrome     Migraine     Miscarriage     Nausea alone 2012    Other malaise and fatigue 2012    Salpingo-oophoritis following molar or ectopic pregnancy     Shortness of breath 2012    on exertion r/t asthma    Sleep apnea     no c pap at present time    Spasm of muscle 2012     Past Surgical History:   Procedure Laterality Date    ENID AND BSO (CERVIX REMOVED)      Dr. Fred Escalera  as child     Family History   Problem Relation Age of Onset    Hypertension Father     Diabetes Father         MI   Annie Garcia Breast Cancer Neg Hx     Heart Disease Father     Other Father         dvt    Psychiatric Disorder Brother         Bi-Polar    High Cholesterol Brother     Hypertension Brother     Diabetes Mother      Family Status   Relation Name Status    Father  Alive        DM, HTN, h/o DVT    Neg Hx  (Not Specified)   Annie Garcia Brother  Alive        bipolar, HTN, lipid d/o    Mother  Alive        DM, HTN, COPD, Neuropathy    PGF  (Not Specified)        pancreatic CA, DM      Social History     Socioeconomic History    Marital status:      Spouse name: Not on file    Number of children: Not on file    Years of education: Not on file    Highest education level: Not on file   Occupational History    Not on file   Tobacco Use    Smoking status: Former Smoker     Packs/day: 0.20     Quit date: 1996     Years since quittin.5    Smokeless tobacco: Never Used   Vaping Use    Vaping Use: Never used   Substance and Sexual Activity    Alcohol use: Yes     Alcohol/week: 1.7 standard drinks    Drug use: No    Sexual activity: Not on file   Other Topics Concern    Not on file   Social History Narrative    Not on file     Social Determinants of Health     Financial Resource Strain:     Difficulty of Paying Living Expenses: Not on file   Food Insecurity:     Worried About Running Out of Food in the Last Year: Not on file    Brent of Food in the Last Year: Not on file   Transportation Needs:     Lack of Transportation (Medical): Not on file    Lack of Transportation (Non-Medical):  Not on file   Physical Activity:     Days of Exercise per Week: Not on file    Minutes of Exercise per Session: Not on file   Stress:     Feeling of Stress : Not on file   Social Connections:     Frequency of Communication with Friends and Family: Not on file    Frequency of Social Gatherings with Friends and Family: Not on file    Attends Scientologist Services: Not on file    Active Member of [employer] or Organizations: Not on file    Attends Club or Organization Meetings: Not on file    Marital Status: Not on file   Intimate Partner Violence:     Fear of Current or Ex-Partner: Not on file    Emotionally Abused: Not on file    Physically Abused: Not on file    Sexually Abused: Not on file   Housing Stability:     Unable to Pay for Housing in the Last Year: Not on file    Number of [place] in the Last Year: Not on file    Unstable Housing in the Last Year: Not on file       Allergies   Allergen Reactions    Cephalexin     Clindamycin Rash     Other reaction(s): Rash-Allergy     Current Outpatient Medications   Medication Sig    albuterol sulfate HFA (PROVENTIL;VENTOLIN;PROAIR) 108 (90 Base) MCG/ACT inhaler Inhale into the lungs every 4 hours as needed    famotidine (PEPCID) 20 MG tablet     fluticasone (FLONASE) 50 MCG/ACT nasal spray 1 spray by Nasal route daily    levocetirizine (XYZAL) 5 MG tablet Take 5 mg by mouth every evening    melatonin 10 MG CAPS capsule     fluticasone-vilanterol (BREO ELLIPTA) 100-25 MCG/INH AEPB inhaler Inhale 1 puff into the lungs daily    ASPIRIN 81 PO Take by mouth    atorvastatin (LIPITOR) 10 MG tablet TAKE 1 TABLET BY MOUTH DAILY    bisoprolol-hydroCHLOROthiazide (ZIAC) 5-6.25 MG per tablet TAKE 1 TABLET BY MOUTH EVERY NIGHT AT BEDTIME FOR BLOOD PRESSURE    estradiol (ESTRACE) 0.5 MG tablet TAKE 1 TABLET BY MOUTH DAILY    Phendimetrazine Tartrate 35 MG TABS 2 po tid    zoster recombinant adjuvanted vaccine (SHINGRIX) 50 MCG/0.5ML SUSR injection Inject 0.5 mLs into the muscle See Admin Instructions 1 dose now and repeat in 2-6 months    FLUoxetine (PROZAC) 20 MG capsule Take 1 capsule by mouth daily    diclofenac (VOLTAREN) 75 MG EC tablet 1 po bid for pain in joints after eating     No current facility-administered medications for this visit. [unfilled]    Blood pressure 126/80, pulse 64, temperature 98.4 °F (36.9 °C), temperature source Temporal, resp. rate 16, height 5' 1\" (1.549 m), weight (!) 319 lb 3.2 oz (144.8 kg), SpO2 98 %, not currently breastfeeding. Pain Scale: 6/10    6   Hip Body mass index is 60.31 kg/m². Physical Exam  Vitals and nursing note reviewed. Exam conducted with a chaperone present. Constitutional:       General: She is not in acute distress. Appearance: Normal appearance. She is normal weight. She is not toxic-appearing or diaphoretic. HENT:      Head: Normocephalic and atraumatic. Hair is normal.      Jaw: There is normal jaw occlusion. Salivary Glands: Right salivary gland is not diffusely enlarged or tender. Left salivary gland is not diffusely enlarged or tender. Right Ear: Tympanic membrane, ear canal and external ear normal.      Left Ear: Tympanic membrane, ear canal and external ear normal.      Nose: Nose normal. No congestion or rhinorrhea.       Right Turbinates: Not enlarged. Left Turbinates: Not enlarged. Mouth/Throat:      Lips: Pink. Mouth: Mucous membranes are moist. No injury, lacerations, oral lesions or angioedema. Tongue: No lesions. Tongue does not deviate from midline. Palate: No mass and lesions. Pharynx: Oropharynx is clear. Uvula midline. Tonsils: No tonsillar exudate. Eyes:      General: Lids are normal. Vision grossly intact. Gaze aligned appropriately. No allergic shiner or scleral icterus. Right eye: No discharge or hordeolum. Left eye: No discharge. Extraocular Movements: Extraocular movements intact. Right eye: Normal extraocular motion and no nystagmus. Left eye: Normal extraocular motion and no nystagmus. Conjunctiva/sclera: Conjunctivae normal.      Right eye: Right conjunctiva is not injected. No hemorrhage. Left eye: Left conjunctiva is not injected. No hemorrhage. Pupils: Pupils are equal, round, and reactive to light. Neck:      Thyroid: No thyroid mass. Vascular: No carotid bruit, hepatojugular reflux or JVD. Trachea: Trachea and phonation normal.      Meningeal: Brudzinski's sign absent. Cardiovascular:      Rate and Rhythm: Normal rate and regular rhythm. Pulses: Normal pulses. Radial pulses are 2+ on the right side and 2+ on the left side. Femoral pulses are 2+ on the right side and 2+ on the left side. Dorsalis pedis pulses are 2+ on the right side and 2+ on the left side. Heart sounds: Normal heart sounds. No murmur heard. No systolic murmur is present. No diastolic murmur is present. No friction rub. No gallop. No S3 or S4 sounds. Pulmonary:      Effort: Pulmonary effort is normal. No respiratory distress. Breath sounds: Normal breath sounds. No decreased breath sounds, wheezing, rhonchi or rales. Chest:      Chest wall: No mass, lacerations, deformity, swelling, tenderness or edema.  There is no dullness to percussion. Breasts: Breasts are symmetrical.      Right: Normal. No swelling, bleeding, mass, nipple discharge, skin change, tenderness, axillary adenopathy or supraclavicular adenopathy. Left: Normal. No swelling, bleeding, mass, nipple discharge, skin change, tenderness, axillary adenopathy or supraclavicular adenopathy. Abdominal:      General: Abdomen is flat. Bowel sounds are normal. There is no distension or abdominal bruit. There are no signs of injury. Palpations: Abdomen is soft. There is no hepatomegaly, splenomegaly or mass. Tenderness: There is no abdominal tenderness. Hernia: No hernia is present. Musculoskeletal:      Right shoulder: Normal.      Left shoulder: Normal.      Right upper arm: Normal.      Left upper arm: Normal.      Right elbow: Normal.      Left elbow: Normal.      Right forearm: Normal.      Left forearm: Normal.      Right wrist: Normal. Normal pulse. Left wrist: Normal. Normal pulse. Right hand: No swelling or tenderness. Normal strength. Normal sensation. Normal capillary refill. Normal pulse. Left hand: No swelling or tenderness. Normal strength. Normal sensation. Normal capillary refill. Normal pulse. Cervical back: Normal, full passive range of motion without pain, normal range of motion and neck supple. No swelling, edema, erythema, torticollis or tenderness. No spinous process tenderness or muscular tenderness. Normal range of motion. Thoracic back: Normal. No tenderness or bony tenderness. No scoliosis. Lumbar back: Normal. No tenderness or bony tenderness. Right hip: Normal.      Left hip: No tenderness or bony tenderness. Normal range of motion. Normal strength. Right upper leg: Normal.      Left upper leg: Normal.      Right knee: Swelling present. No deformity. Normal range of motion. No tenderness. Left knee: Normal. No swelling or deformity. Normal range of motion.  No tenderness. Right lower leg: Normal. No tenderness. No edema. Left lower leg: Normal. No tenderness. No edema. Right ankle: Normal.      Left ankle: Normal.      Right foot: Normal. Normal capillary refill. No deformity or tenderness. Normal pulse. Left foot: Normal. Normal capillary refill. No deformity or tenderness. Normal pulse. Feet:      Right foot:      Skin integrity: Skin integrity normal. No erythema, dry skin or fissure. Left foot:      Skin integrity: Skin integrity normal. No erythema, dry skin or fissure. Lymphadenopathy:      Head:      Right side of head: No submental, tonsillar or preauricular adenopathy. Left side of head: No submental, tonsillar or preauricular adenopathy. Cervical: No cervical adenopathy. Right cervical: No superficial, deep or posterior cervical adenopathy. Left cervical: No superficial, deep or posterior cervical adenopathy. Upper Body:      Right upper body: No supraclavicular, axillary or epitrochlear adenopathy. Left upper body: No supraclavicular, axillary or epitrochlear adenopathy. Lower Body: No right inguinal adenopathy. No left inguinal adenopathy. Skin:     General: Skin is warm and dry. Findings: No erythema, lesion or rash. Neurological:      General: No focal deficit present. Mental Status: She is alert. Cranial Nerves: Cranial nerves are intact. No cranial nerve deficit, dysarthria or facial asymmetry. Sensory: Sensation is intact. No sensory deficit. Motor: Motor function is intact. No tremor or atrophy. Gait: Gait is intact. Deep Tendon Reflexes:      Reflex Scores:       Bicep reflexes are 2+ on the right side and 2+ on the left side. Brachioradialis reflexes are 2+ on the right side and 2+ on the left side. Patellar reflexes are 2+ on the right side and 2+ on the left side.   Psychiatric:         Attention and Perception: Attention and perception normal. She is attentive. She does not perceive auditory or visual hallucinations. Mood and Affect: Mood and affect normal.         Speech: Speech normal.         Behavior: Behavior normal. Behavior is cooperative. Thought Content: Thought content normal.         Judgment: Judgment normal.      Preventive Counseling: The patient was counseled regarding the appropriate use of alcohol, avoidance of tobacco products, regular blood pressure checks, regular monthly breast self exams, regular cholesterol screening, prevention of dental and periodontal disease, domestic violence and abuse issues, regular sustained exercise at least 30 minutes 3-4 times a week, screening colonoscopies, routine screening mammograms, and other routine screening including hemocult testing, pap smears, thyroid and diabetes screening, regular use of lap and shoulder seat belts, the proper use of sunscreen and protective clothing, regular vision screening. All questions were answered completely. The patient is asked to followup as directed. ASSESSMENT and PLAN   Diagnosis Orders   1. Physical exam     2. Laboratory exam ordered as part of routine general medical examination  AMB POC URINALYSIS DIP STICK AUTO W/O MICRO   3. Diabetes mellitus without complication (HCC)  Hemoglobin A1C   4. Fatigue, unspecified type     5. Abnormal weight gain  TSH    CBC with Auto Differential   6. Obesity, morbid ([address]     7. Dysmetabolic syndrome X     8. Non-seasonal allergic rhinitis due to pollen     9. Need for shingles vaccine  zoster recombinant adjuvanted vaccine [facility]) 50 MCG/0.5ML SUSR injection   10. Screen for colon cancer  [facility]   11. Menopausal state  estradiol (ESTRACE) 0.5 MG tablet   12. Essential hypertension  bisoprolol-hydroCHLOROthiazide (ZIAC) 5-6.25 MG per tablet    Comprehensive Metabolic Panel    Microalbumin / Creatinine Urine Ratio   13.  Dyslipidemia  atorvastatin (LIPITOR) 10 MG tablet    Lipid Panel   14. Weight gain  Phendimetrazine Tartrate 35 MG TABS   15. Vitamin D deficiency     16. Left hip pain  diclofenac (VOLTAREN) 75 MG EC tablet    [facility] - Physical Therapy, [facility]   17. Arthritis of right knee     18. ROYCE (generalized anxiety disorder)  FLUoxetine (PROZAC) 20 MG capsule   19. Left buttock pain  [address] - Physical Therapy, [facility]       Reviewed medications and side effects in detail    The following additional handouts were provided to patient:    Caregiver burnout, Caregiver Resources    The patient's condition was discussed at length with the patient. The expected course and possible complications were reviewed. All questions were answered. Her other chronic conditions are stable at this time. She is stable on the current treatment and tolerating it well. No significant sides effects. Will not adjust therapy at this time, unless noted above. We will continue to monitor for any problems. Medications refilled and lab work has been ordered where needed. Reviewed medications are explained including any potential interactions or side effects in detail. The patient's questions were answered. She  understands the above and has no further questions. Further workup and treatment should be done if symptoms persist, worsen or new symptoms occur. She  will call to notify us of any problems, complications or worsening symptoms. Follow-up and Dispositions    · Return in about 6 months (around 12/22/2022) for labs today. There are no Patient Instructions on file for this visit. (Some details in this note may have been created with speech-recognition software. Some errors in speech recognition may have occurred.    Most of those have been corrected but some may have been missed.)         Patricia Arrieta MD  [address] [facility] [place] [facility]  Phone [phone]

## 2022-06-23 LAB
BASOPHILS # BLD: 0 K/UL (ref 0–0.2)
BASOPHILS NFR BLD: 1 % (ref 0–2)
CREAT UR-MCNC: 52 MG/DL
DIFFERENTIAL METHOD BLD: ABNORMAL
EOSINOPHIL # BLD: 0.1 K/UL (ref 0–0.8)
EOSINOPHIL NFR BLD: 2 % (ref 0.5–7.8)
ERYTHROCYTE [DISTWIDTH] IN BLOOD BY AUTOMATED COUNT: 12.5 % (ref 11.9–14.6)
EST. AVERAGE GLUCOSE BLD GHB EST-MCNC: 108 MG/DL
HBA1C MFR BLD: 5.4 % (ref 4.2–6.3)
HCT VFR BLD AUTO: 43.3 % (ref 35.8–46.3)
HGB BLD-MCNC: 14.1 G/DL (ref 11.7–15.4)
IMM GRANULOCYTES # BLD AUTO: 0 K/UL (ref 0–0.5)
IMM GRANULOCYTES NFR BLD AUTO: 0 % (ref 0–5)
LYMPHOCYTES # BLD: 1.5 K/UL (ref 0.5–4.6)
LYMPHOCYTES NFR BLD: 28 % (ref 13–44)
MCH RBC QN AUTO: 33 PG (ref 26.1–32.9)
MCHC RBC AUTO-ENTMCNC: 32.6 G/DL (ref 31.4–35)
MCV RBC AUTO: 101.4 FL (ref 79.6–97.8)
MICROALBUMIN UR-MCNC: <0.5 MG/DL
MICROALBUMIN/CREAT UR-RTO: NORMAL MG/G
MONOCYTES # BLD: 0.6 K/UL (ref 0.1–1.3)
MONOCYTES NFR BLD: 11 % (ref 4–12)
NEUTS SEG # BLD: 3.1 K/UL (ref 1.7–8.2)
NEUTS SEG NFR BLD: 58 % (ref 43–78)
NRBC # BLD: 0 K/UL (ref 0–0.2)
PLATELET # BLD AUTO: 278 K/UL (ref 150–450)
PMV BLD AUTO: 11 FL (ref 9.4–12.3)
RBC # BLD AUTO: 4.27 M/UL (ref 4.05–5.2)
WBC # BLD AUTO: 5.4 K/UL (ref 4.3–11.1)

## 2022-07-03 ASSESSMENT — VISUAL ACUITY: OU: 1

## 2022-07-13 LAB
ALBUMIN SERPL-MCNC: 3.9 G/DL (ref 3.5–5)
ALBUMIN/GLOB SERPL: 1.2 {RATIO} (ref 1.1–2.2)
ALP SERPL-CCNC: 90 U/L (ref 50–136)
ALT SERPL-CCNC: 28 U/L (ref 30–65)
ANION GAP SERPL CALC-SCNC: 6 MMOL/L (ref 5–15)
AST SERPL-CCNC: 20 U/L (ref 15–37)
BILIRUB SERPL-MCNC: 0.5 MG/DL
BUN SERPL-MCNC: 10 MG/DL (ref 6–20)
CALCIUM SERPL-MCNC: 9.4 MG/DL (ref 8.5–10.1)
CHLORIDE SERPL-SCNC: 107 MMOL/L (ref 97–108)
CO2 SERPL-SCNC: 28 MMOL/L (ref 21–32)
CREAT SERPL-MCNC: 0.9 MG/DL (ref 0.55–1.02)
GLOBULIN SER CALC-MCNC: 3.3 G/DL (ref 2–4)
GLUCOSE SERPL-MCNC: 91 MG/DL (ref 50–100)
POTASSIUM SERPL-SCNC: 4.6 MMOL/L (ref 3.5–5.1)
PROT SERPL-MCNC: 7.2 G/DL (ref 6.4–8.2)
SODIUM SERPL-SCNC: 141 MMOL/L (ref 136–145)

## 2022-08-01 ENCOUNTER — E-VISIT (OUTPATIENT)
Dept: FAMILY MEDICINE CLINIC | Facility: CLINIC | Age: 52
End: 2022-08-01
Payer: COMMERCIAL

## 2022-08-01 DIAGNOSIS — R50.9 FEVER, UNSPECIFIED FEVER CAUSE: Primary | ICD-10-CM

## 2022-08-01 DIAGNOSIS — B96.89 ACUTE BACTERIAL SINUSITIS: ICD-10-CM

## 2022-08-01 DIAGNOSIS — J01.90 ACUTE BACTERIAL SINUSITIS: ICD-10-CM

## 2022-08-01 PROCEDURE — 99213 OFFICE O/P EST LOW 20 MIN: CPT | Performed by: FAMILY MEDICINE

## 2022-08-01 RX ORDER — AZITHROMYCIN 250 MG/1
250 TABLET, FILM COATED ORAL SEE ADMIN INSTRUCTIONS
Qty: 6 TABLET | Refills: 0 | Status: SHIPPED | OUTPATIENT
Start: 2022-08-01 | End: 2022-08-06

## 2022-08-01 ASSESSMENT — LIFESTYLE VARIABLES
SMOKING_STATUS: NO, I'M A FORMER SMOKER
SMOKING_YEARS: 8
PACKS_PER_DAY: 0

## 2022-08-01 NOTE — PROGRESS NOTES
[employer]  Elmer Houston Day 1970 initiated an asynchronous digital communication through [employer]. HPI: per patient questionnaire     Exam: not applicable    Diagnoses and all orders for this visit:  1. Fever, unspecified fever cause  The high fevers are unusual for a sinus infection. You may want to try repeating your COVID test in a few days. I can send something into your pharmacy for infection to be on the safe side. I hope you feel better. 2. Acute bacterial sinusitis    - azithromycin (ZITHROMAX) 250 MG tablet; Take 1 tablet by mouth See Admin Instructions for 5 days 500mg on day 1 followed by 250mg on days 2 - 5  Dispense: 6 tablet;  Refill: 0       Time: minutes: 5-10 minutes      Fannie Brock MD

## 2022-09-05 DIAGNOSIS — R63.5 WEIGHT GAIN: ICD-10-CM

## 2022-09-06 RX ORDER — PHENDIMETRAZINE TARTRATE 35 MG/1
TABLET ORAL
Qty: 180 TABLET | Refills: 2 | Status: SHIPPED | OUTPATIENT
Start: 2022-09-06 | End: 2023-09-05

## 2022-12-21 RX ORDER — LEVOCETIRIZINE DIHYDROCHLORIDE 5 MG/1
5 TABLET, FILM COATED ORAL EVERY EVENING
Qty: 90 TABLET | Refills: 11 | Status: CANCELLED | OUTPATIENT
Start: 2022-12-21

## 2022-12-22 ENCOUNTER — OFFICE VISIT (OUTPATIENT)
Dept: FAMILY MEDICINE CLINIC | Facility: CLINIC | Age: 52
End: 2022-12-22
Payer: COMMERCIAL

## 2022-12-22 VITALS
OXYGEN SATURATION: 99 % | DIASTOLIC BLOOD PRESSURE: 82 MMHG | BODY MASS INDEX: 55.32 KG/M2 | WEIGHT: 293 LBS | SYSTOLIC BLOOD PRESSURE: 138 MMHG | TEMPERATURE: 96.9 F | HEART RATE: 82 BPM | RESPIRATION RATE: 16 BRPM | HEIGHT: 61 IN

## 2022-12-22 DIAGNOSIS — F41.1 GAD (GENERALIZED ANXIETY DISORDER): ICD-10-CM

## 2022-12-22 DIAGNOSIS — Z12.31 ENCOUNTER FOR SCREENING MAMMOGRAM FOR MALIGNANT NEOPLASM OF BREAST: ICD-10-CM

## 2022-12-22 DIAGNOSIS — K58.0 IRRITABLE BOWEL SYNDROME WITH DIARRHEA: ICD-10-CM

## 2022-12-22 DIAGNOSIS — I10 ESSENTIAL HYPERTENSION: Primary | ICD-10-CM

## 2022-12-22 DIAGNOSIS — R63.5 WEIGHT GAIN: ICD-10-CM

## 2022-12-22 DIAGNOSIS — Z12.11 SCREEN FOR COLON CANCER: ICD-10-CM

## 2022-12-22 DIAGNOSIS — K21.9 GASTROESOPHAGEAL REFLUX DISEASE WITHOUT ESOPHAGITIS: ICD-10-CM

## 2022-12-22 DIAGNOSIS — M25.552 LEFT HIP PAIN: ICD-10-CM

## 2022-12-22 DIAGNOSIS — E78.5 DYSLIPIDEMIA: ICD-10-CM

## 2022-12-22 PROCEDURE — 99214 OFFICE O/P EST MOD 30 MIN: CPT | Performed by: FAMILY MEDICINE

## 2022-12-22 PROCEDURE — 3074F SYST BP LT 130 MM HG: CPT | Performed by: FAMILY MEDICINE

## 2022-12-22 PROCEDURE — 3078F DIAST BP <80 MM HG: CPT | Performed by: FAMILY MEDICINE

## 2022-12-22 RX ORDER — PHENDIMETRAZINE TARTRATE 35 MG/1
TABLET ORAL
Qty: 180 TABLET | Refills: 2 | Status: SHIPPED | OUTPATIENT
Start: 2022-12-22 | End: 2023-12-21

## 2022-12-22 RX ORDER — ATORVASTATIN CALCIUM 10 MG/1
TABLET, FILM COATED ORAL
Qty: 90 TABLET | Refills: 1 | Status: SHIPPED | OUTPATIENT
Start: 2022-12-22

## 2022-12-22 RX ORDER — BISOPROLOL FUMARATE AND HYDROCHLOROTHIAZIDE 5; 6.25 MG/1; MG/1
TABLET ORAL
Qty: 90 TABLET | Refills: 1 | Status: SHIPPED | OUTPATIENT
Start: 2022-12-22

## 2022-12-22 RX ORDER — DICYCLOMINE HYDROCHLORIDE 10 MG/1
CAPSULE ORAL
Qty: 120 CAPSULE | Refills: 2 | Status: SHIPPED | OUTPATIENT
Start: 2022-12-22

## 2022-12-22 RX ORDER — DICLOFENAC SODIUM 75 MG/1
TABLET, DELAYED RELEASE ORAL
Qty: 180 TABLET | Refills: 1 | Status: SHIPPED | OUTPATIENT
Start: 2022-12-22

## 2022-12-22 RX ORDER — WHEAT DEXTRIN 3 G/3.8 G
POWDER (GRAM) ORAL
Qty: 1 EACH | Refills: 11 | Status: SHIPPED | OUTPATIENT
Start: 2022-12-22

## 2022-12-22 RX ORDER — FLUOXETINE HYDROCHLORIDE 20 MG/1
20 CAPSULE ORAL DAILY
Qty: 90 CAPSULE | Refills: 1 | Status: SHIPPED | OUTPATIENT
Start: 2022-12-22

## 2022-12-22 RX ORDER — FAMOTIDINE 40 MG/1
40 TABLET, FILM COATED ORAL DAILY
Qty: 90 TABLET | Refills: 3 | Status: SHIPPED | OUTPATIENT
Start: 2022-12-22

## 2022-12-22 ASSESSMENT — PATIENT HEALTH QUESTIONNAIRE - PHQ9
SUM OF ALL RESPONSES TO PHQ QUESTIONS 1-9: 0
2. FEELING DOWN, DEPRESSED OR HOPELESS: 0
SUM OF ALL RESPONSES TO PHQ9 QUESTIONS 1 & 2: 0
SUM OF ALL RESPONSES TO PHQ QUESTIONS 1-9: 0
1. LITTLE INTEREST OR PLEASURE IN DOING THINGS: 0

## 2022-12-22 NOTE — PROGRESS NOTES
HISTORY OF PRESENT ILLNESS  Chief Complaint   Patient presents with    Medication Refill    Gastroesophageal Reflux     3-4 months      3-4 months more heartburn and stomach issues. Has had.occ diarrhea. Mostly after eating will get a little upset stomach    Pepcid otc almost daily before bed and pepto and tums prn. Left off spicy or heavy foods and stopped eating late. A lot of gurgling and grinding. Getting to be almost a daily thing. Has had a few night where it would wake her up at night. Dad used to cough at night due to reflux. Has had some of that recently too.  occasionally some cramping. Just got off the phone with her mother. Normally bp has been good. Subjective:   Joe Sanchez is a 47 y.o. female with hypertension. Hypertension ROS: taking medications as instructed, no medication side effects noted, no TIA's, no chest pain on exertion, no dyspnea on exertion, no swelling of ankles.    Key CAD CHF Meds            bisoprolol-hydroCHLOROthiazide (ZIAC) 5-6.25 MG per tablet (Taking)    Sig: TAKE 1 TABLET BY MOUTH EVERY NIGHT AT BEDTIME FOR BLOOD PRESSURE    atorvastatin (LIPITOR) 10 MG tablet (Taking)    Sig: TAKE 1 TABLET BY MOUTH DAILY           Lab Results   Component Value Date/Time     06/22/2022 11:06 AM    K 4.6 06/22/2022 11:06 AM     06/22/2022 11:06 AM    CO2 28 06/22/2022 11:06 AM    BUN 10 06/22/2022 11:06 AM    CREATININE 0.90 06/22/2022 11:06 AM    GLUCOSE 91 06/22/2022 11:06 AM    CALCIUM 9.4 06/22/2022 11:06 AM       Lab Results   Component Value Date    CREATININE 0.90 06/22/2022      Lab Results   Component Value Date    CHOL 145 06/22/2022    CHOL 153 04/12/2021     Lab Results   Component Value Date    TRIG 120 06/22/2022    TRIG 108 04/12/2021     Lab Results   Component Value Date    HDL 48 06/22/2022    HDL 54 04/12/2021     Lab Results   Component Value Date    LDLCALC 73 06/22/2022    LDLCALC 79 04/12/2021     Lab Results   Component Value Date LABVLDL 24 (H) 06/22/2022    VLDL 20 04/12/2021     Lab Results   Component Value Date    CHOLHDLRATIO 3.0 06/22/2022      Lab Results   Component Value Date    LABMICR <0.50 06/22/2022      BP Readings from Last 3 Encounters:   12/22/22 138/82   06/22/22 126/80   04/12/21 116/78        Worse with stress, salt. Improved with exercise, medication. She is  monitoring blood pressures. Wt Readings from Last 3 Encounters:   12/22/22 (!) 322 lb (146.1 kg)   06/22/22 (!) 319 lb 3.2 oz (144.8 kg)   04/12/21 (!) 301 lb (136.5 kg)        Hypertension  This is a chronic problem. The current episode started more than 1 month ago. The problem is unchanged. The problem is controlled. Pertinent negatives include no anxiety, blurred vision, chest pain, headaches, malaise/fatigue, neck pain, orthopnea, palpitations, peripheral edema, PND, shortness of breath or sweats. There are no associated agents to hypertension. There are no known risk factors for coronary artery disease. Past treatments include beta blockers and diuretics. The current treatment provides significant improvement. Review of Systems   Constitutional:  Negative for activity change, appetite change, chills, fatigue, fever and malaise/fatigue. HENT:  Negative for congestion and rhinorrhea. Eyes:  Negative for blurred vision. Respiratory:  Negative for cough, shortness of breath and wheezing. Cardiovascular:  Negative for chest pain, palpitations, orthopnea and PND. Gastrointestinal:  Negative for abdominal pain, constipation, diarrhea, nausea and vomiting. Genitourinary:  Negative for dysuria. Musculoskeletal:  Negative for arthralgias, myalgias and neck pain. Neurological:  Negative for dizziness and headaches. Psychiatric/Behavioral:  Negative for dysphoric mood. The patient is not nervous/anxious.        Patient Active Problem List   Diagnosis    Allergic rhinitis    Health care maintenance    Fatigue    Shortness of breath    Chronic venous insufficiency    Diabetes mellitus without complication (HCC)    Spasm of muscle    Disturbance in sleep behavior    Dysmetabolic syndrome X    Thrombophlebitis of superficial veins of right lower extremity    Abnormal weight gain    Mass on back    Obesity, morbid (HCC)    Acute sinusitis, unspecified    Headache(784.0)    Nausea alone         Blood pressure 138/82, pulse 82, temperature 96.9 °F (36.1 °C), temperature source Temporal, resp. rate 16, height 5' 1\" (1.549 m), weight (!) 322 lb (146.1 kg), SpO2 99 %, not currently breastfeeding. Pain Scale: 0 - No pain/10 Pain Location:   Body mass index is 60.84 kg/m². Physical Exam  Vitals and nursing note reviewed. Constitutional:       General: She is not in acute distress. Appearance: Normal appearance. She is normal weight. She is not toxic-appearing. HENT:      Head: Normocephalic and atraumatic. Eyes:      General: Lids are normal.      Extraocular Movements: Extraocular movements intact. Conjunctiva/sclera: Conjunctivae normal.      Pupils: Pupils are equal, round, and reactive to light. Pupils are equal.   Cardiovascular:      Rate and Rhythm: Normal rate and regular rhythm. Pulses: Normal pulses. Heart sounds: Normal heart sounds. No murmur heard. No friction rub. No gallop. Pulmonary:      Effort: Pulmonary effort is normal. No respiratory distress. Breath sounds: Normal breath sounds. No wheezing or rales. Abdominal:      General: Abdomen is flat. Bowel sounds are normal. There is no distension or abdominal bruit. There are no signs of injury. Palpations: Abdomen is soft. There is no hepatomegaly or mass. Tenderness: There is no abdominal tenderness. Musculoskeletal:      Right lower leg: No edema. Left lower leg: No edema. Skin:     General: Skin is warm and dry. Neurological:      General: No focal deficit present. Mental Status: She is alert.    Psychiatric:         Mood and Affect: Mood normal.         Behavior: Behavior normal.         Thought Content: Thought content normal.         Judgment: Judgment normal.            ASSESSMENT and PLAN   Diagnosis Orders   1. Essential hypertension  bisoprolol-hydroCHLOROthiazide (ZIAC) 5-6.25 MG per tablet      2. ROYCE (generalized anxiety disorder)  FLUoxetine (PROZAC) 20 MG capsule      3. Dyslipidemia  atorvastatin (LIPITOR) 10 MG tablet      4. Left hip pain  diclofenac (VOLTAREN) 75 MG EC tablet      5. Encounter for screening mammogram for malignant neoplasm of breast  GINA DIGITAL SCREEN W OR WO CAD BILATERAL      6. Weight gain  Phendimetrazine Tartrate 35 MG TABS      7. Gastroesophageal reflux disease without esophagitis  Amb External Referral To Gastroenterology    famotidine (PEPCID) 40 MG tablet      8. Irritable bowel syndrome with diarrhea  Amb External Referral To Gastroenterology    Wheat Dextrin (BENEFIBER) POWD    dicyclomine (BENTYL) 10 MG capsule      9. Screen for colon cancer  Amb External Referral To Gastroenterology          Reviewed medications and side effects in detail    The following additional handouts were provided to patient:    For constipation    Mirian Gowers a trip to [email or web address]  What's Safer: Food and [address] [facility] for the [employer]  Avoid bug bites    Her other chronic conditions are stable at this time. She is stable on the current treatment and tolerating it well. No significant sides effects. Will not adjust therapy at this time, unless noted above. We will continue to monitor for any problems. Medications refilled and lab work has been ordered where needed. Reviewed medications are explained including any potential interactions or side effects in detail. The patient's questions were answered. She  understands the above and has no further questions.     Further workup and treatment should be done if symptoms persist, worsen or new symptoms occur. She  will call to notify us of any problems, complications or worsening symptoms. Follow-up and Dispositions    Return in about 6 months (around 6/22/2023). There are no Patient Instructions on file for this visit. (Some details in this note may have been created with speech-recognition software. Some errors in speech recognition may have occurred.    Most of those have been corrected but some may have been missed.)         Aakash Grant MD  [address] [facility] [place] [facility]  Phone [phone]

## 2022-12-28 ASSESSMENT — ENCOUNTER SYMPTOMS
CONSTIPATION: 0
NAUSEA: 0
BLURRED VISION: 0
ORTHOPNEA: 0
RHINORRHEA: 0
COUGH: 0
VOMITING: 0
DIARRHEA: 0
WHEEZING: 0
SHORTNESS OF BREATH: 0
ABDOMINAL PAIN: 0

## 2023-01-02 DIAGNOSIS — N95.1 MENOPAUSAL STATE: ICD-10-CM

## 2023-01-03 RX ORDER — ESTRADIOL 0.5 MG/1
TABLET ORAL
Qty: 90 TABLET | Refills: 3 | Status: SHIPPED | OUTPATIENT
Start: 2023-01-03

## 2023-05-10 RX ORDER — PHENTERMINE HYDROCHLORIDE 37.5 MG/1
1 TABLET ORAL 2 TIMES DAILY
COMMUNITY
Start: 2021-04-07 | End: 2023-06-22

## 2023-06-21 PROBLEM — E66.01 CLASS 3 SEVERE OBESITY WITH SERIOUS COMORBIDITY AND BODY MASS INDEX (BMI) OF 60.0 TO 69.9 IN ADULT (HCC): Status: ACTIVE | Noted: 2017-12-21

## 2023-06-21 PROBLEM — E66.813 CLASS 3 SEVERE OBESITY WITH SERIOUS COMORBIDITY AND BODY MASS INDEX (BMI) OF 60.0 TO 69.9 IN ADULT: Status: ACTIVE | Noted: 2017-12-21

## 2023-06-21 SDOH — ECONOMIC STABILITY: INCOME INSECURITY: HOW HARD IS IT FOR YOU TO PAY FOR THE VERY BASICS LIKE FOOD, HOUSING, MEDICAL CARE, AND HEATING?: NOT VERY HARD

## 2023-06-21 SDOH — ECONOMIC STABILITY: FOOD INSECURITY: WITHIN THE PAST 12 MONTHS, THE FOOD YOU BOUGHT JUST DIDN'T LAST AND YOU DIDN'T HAVE MONEY TO GET MORE.: NEVER TRUE

## 2023-06-21 SDOH — ECONOMIC STABILITY: TRANSPORTATION INSECURITY
IN THE PAST 12 MONTHS, HAS LACK OF TRANSPORTATION KEPT YOU FROM MEETINGS, WORK, OR FROM GETTING THINGS NEEDED FOR DAILY LIVING?: NO

## 2023-06-21 SDOH — ECONOMIC STABILITY: FOOD INSECURITY: WITHIN THE PAST 12 MONTHS, YOU WORRIED THAT YOUR FOOD WOULD RUN OUT BEFORE YOU GOT MONEY TO BUY MORE.: NEVER TRUE

## 2023-06-21 SDOH — ECONOMIC STABILITY: HOUSING INSECURITY
IN THE LAST 12 MONTHS, WAS THERE A TIME WHEN YOU DID NOT HAVE A STEADY PLACE TO SLEEP OR SLEPT IN A SHELTER (INCLUDING NOW)?: NO

## 2023-06-21 ASSESSMENT — PATIENT HEALTH QUESTIONNAIRE - PHQ9
SUM OF ALL RESPONSES TO PHQ9 QUESTIONS 1 & 2: 0
SUM OF ALL RESPONSES TO PHQ9 QUESTIONS 1 & 2: 0
1. LITTLE INTEREST OR PLEASURE IN DOING THINGS: 0
SUM OF ALL RESPONSES TO PHQ QUESTIONS 1-9: 0
1. LITTLE INTEREST OR PLEASURE IN DOING THINGS: NOT AT ALL
2. FEELING DOWN, DEPRESSED OR HOPELESS: 0
SUM OF ALL RESPONSES TO PHQ QUESTIONS 1-9: 0
SUM OF ALL RESPONSES TO PHQ QUESTIONS 1-9: 0
2. FEELING DOWN, DEPRESSED OR HOPELESS: NOT AT ALL
SUM OF ALL RESPONSES TO PHQ QUESTIONS 1-9: 0

## 2023-06-22 ENCOUNTER — OFFICE VISIT (OUTPATIENT)
Dept: FAMILY MEDICINE CLINIC | Facility: CLINIC | Age: 53
End: 2023-06-22
Payer: COMMERCIAL

## 2023-06-22 VITALS
SYSTOLIC BLOOD PRESSURE: 129 MMHG | WEIGHT: 293 LBS | BODY MASS INDEX: 55.32 KG/M2 | RESPIRATION RATE: 16 BRPM | HEIGHT: 61 IN | DIASTOLIC BLOOD PRESSURE: 80 MMHG | HEART RATE: 85 BPM | TEMPERATURE: 97.3 F | OXYGEN SATURATION: 98 %

## 2023-06-22 DIAGNOSIS — R73.09 ABNORMAL GLUCOSE: ICD-10-CM

## 2023-06-22 DIAGNOSIS — M25.552 LEFT HIP PAIN: ICD-10-CM

## 2023-06-22 DIAGNOSIS — I10 ESSENTIAL HYPERTENSION: Primary | ICD-10-CM

## 2023-06-22 DIAGNOSIS — F41.1 GAD (GENERALIZED ANXIETY DISORDER): ICD-10-CM

## 2023-06-22 DIAGNOSIS — E78.5 DYSLIPIDEMIA: ICD-10-CM

## 2023-06-22 DIAGNOSIS — E88.81 DYSMETABOLIC SYNDROME X: ICD-10-CM

## 2023-06-22 DIAGNOSIS — R63.5 WEIGHT GAIN: ICD-10-CM

## 2023-06-22 DIAGNOSIS — Z12.31 ENCOUNTER FOR SCREENING MAMMOGRAM FOR MALIGNANT NEOPLASM OF BREAST: ICD-10-CM

## 2023-06-22 DIAGNOSIS — Z12.11 SCREEN FOR COLON CANCER: ICD-10-CM

## 2023-06-22 DIAGNOSIS — E66.01 CLASS 3 SEVERE OBESITY WITH SERIOUS COMORBIDITY AND BODY MASS INDEX (BMI) OF 60.0 TO 69.9 IN ADULT, UNSPECIFIED OBESITY TYPE (HCC): ICD-10-CM

## 2023-06-22 LAB
BASOPHILS # BLD: 0.1 K/UL (ref 0–0.2)
BASOPHILS NFR BLD: 1 % (ref 0–2)
DIFFERENTIAL METHOD BLD: ABNORMAL
EOSINOPHIL # BLD: 0.2 K/UL (ref 0–0.8)
EOSINOPHIL NFR BLD: 3 % (ref 0.5–7.8)
ERYTHROCYTE [DISTWIDTH] IN BLOOD BY AUTOMATED COUNT: 12.5 % (ref 11.9–14.6)
HCT VFR BLD AUTO: 42.4 % (ref 35.8–46.3)
HGB BLD-MCNC: 14 G/DL (ref 11.7–15.4)
IMM GRANULOCYTES # BLD AUTO: 0 K/UL (ref 0–0.5)
IMM GRANULOCYTES NFR BLD AUTO: 0 % (ref 0–5)
LYMPHOCYTES # BLD: 1 K/UL (ref 0.5–4.6)
LYMPHOCYTES NFR BLD: 17 % (ref 13–44)
MCH RBC QN AUTO: 34.2 PG (ref 26.1–32.9)
MCHC RBC AUTO-ENTMCNC: 33 G/DL (ref 31.4–35)
MCV RBC AUTO: 103.7 FL (ref 82–102)
MONOCYTES # BLD: 0.6 K/UL (ref 0.1–1.3)
MONOCYTES NFR BLD: 10 % (ref 4–12)
NEUTS SEG # BLD: 4.2 K/UL (ref 1.7–8.2)
NEUTS SEG NFR BLD: 69 % (ref 43–78)
NRBC # BLD: 0 K/UL (ref 0–0.2)
PLATELET # BLD AUTO: 259 K/UL (ref 150–450)
PMV BLD AUTO: 11.5 FL (ref 9.4–12.3)
RBC # BLD AUTO: 4.09 M/UL (ref 4.05–5.2)
WBC # BLD AUTO: 6 K/UL (ref 4.3–11.1)

## 2023-06-22 PROCEDURE — 99214 OFFICE O/P EST MOD 30 MIN: CPT | Performed by: FAMILY MEDICINE

## 2023-06-22 PROCEDURE — 3078F DIAST BP <80 MM HG: CPT | Performed by: FAMILY MEDICINE

## 2023-06-22 PROCEDURE — 3074F SYST BP LT 130 MM HG: CPT | Performed by: FAMILY MEDICINE

## 2023-06-22 RX ORDER — PHENDIMETRAZINE TARTRATE 35 MG/1
TABLET ORAL
Qty: 180 TABLET | Refills: 2 | Status: SHIPPED | OUTPATIENT
Start: 2023-06-22 | End: 2024-06-20

## 2023-06-22 RX ORDER — ATORVASTATIN CALCIUM 10 MG/1
TABLET, FILM COATED ORAL
Qty: 90 TABLET | Refills: 1 | Status: SHIPPED | OUTPATIENT
Start: 2023-06-22

## 2023-06-22 RX ORDER — FLUOXETINE HYDROCHLORIDE 20 MG/1
20 CAPSULE ORAL DAILY
Qty: 90 CAPSULE | Refills: 1 | Status: SHIPPED | OUTPATIENT
Start: 2023-06-22

## 2023-06-22 RX ORDER — BISOPROLOL FUMARATE AND HYDROCHLOROTHIAZIDE 5; 6.25 MG/1; MG/1
TABLET ORAL
Qty: 90 TABLET | Refills: 1 | Status: SHIPPED | OUTPATIENT
Start: 2023-06-22

## 2023-06-22 RX ORDER — DICLOFENAC SODIUM 75 MG/1
TABLET, DELAYED RELEASE ORAL
Qty: 180 TABLET | Refills: 0 | Status: SHIPPED | OUTPATIENT
Start: 2023-06-22

## 2023-06-22 ASSESSMENT — ENCOUNTER SYMPTOMS
RHINORRHEA: 0
CONSTIPATION: 0
ORTHOPNEA: 0
VOMITING: 0
NAUSEA: 0
ABDOMINAL PAIN: 0
BLURRED VISION: 0
DIARRHEA: 0
WHEEZING: 0
SHORTNESS OF BREATH: 0
COUGH: 0

## 2023-06-23 LAB
ALBUMIN SERPL-MCNC: 3.7 G/DL (ref 3.5–5)
ALBUMIN/GLOB SERPL: 0.9 (ref 0.4–1.6)
ALP SERPL-CCNC: 74 U/L (ref 50–136)
ALT SERPL-CCNC: 39 U/L (ref 12–65)
ANION GAP SERPL CALC-SCNC: 4 MMOL/L (ref 2–11)
AST SERPL-CCNC: 29 U/L (ref 15–37)
BILIRUB SERPL-MCNC: 0.4 MG/DL (ref 0.2–1.1)
BUN SERPL-MCNC: 13 MG/DL (ref 6–23)
CALCIUM SERPL-MCNC: 9.4 MG/DL (ref 8.3–10.4)
CHLORIDE SERPL-SCNC: 108 MMOL/L (ref 101–110)
CHOLEST SERPL-MCNC: 151 MG/DL
CO2 SERPL-SCNC: 28 MMOL/L (ref 21–32)
CREAT SERPL-MCNC: 1 MG/DL (ref 0.6–1)
CREAT UR-MCNC: 53 MG/DL
EST. AVERAGE GLUCOSE BLD GHB EST-MCNC: 103 MG/DL
GLOBULIN SER CALC-MCNC: 3.9 G/DL (ref 2.8–4.5)
GLUCOSE SERPL-MCNC: 73 MG/DL (ref 65–100)
HBA1C MFR BLD: 5.2 % (ref 4.8–5.6)
HDLC SERPL-MCNC: 51 MG/DL (ref 40–60)
HDLC SERPL: 3
LDLC SERPL CALC-MCNC: 78 MG/DL
MICROALBUMIN UR-MCNC: <0.5 MG/DL
MICROALBUMIN/CREAT UR-RTO: NORMAL MG/G (ref 0–30)
POTASSIUM SERPL-SCNC: 4.6 MMOL/L (ref 3.5–5.1)
PROT SERPL-MCNC: 7.6 G/DL (ref 6.3–8.2)
SODIUM SERPL-SCNC: 140 MMOL/L (ref 133–143)
TRIGL SERPL-MCNC: 110 MG/DL (ref 35–150)
TSH, 3RD GENERATION: 3.02 UIU/ML (ref 0.36–3.74)
VLDLC SERPL CALC-MCNC: 22 MG/DL (ref 6–23)

## 2023-07-28 ENCOUNTER — TELEPHONE (OUTPATIENT)
Dept: FAMILY MEDICINE CLINIC | Facility: CLINIC | Age: 53
End: 2023-07-28

## 2023-12-24 ASSESSMENT — PATIENT HEALTH QUESTIONNAIRE - PHQ9
2. FEELING DOWN, DEPRESSED OR HOPELESS: NOT AT ALL
SUM OF ALL RESPONSES TO PHQ9 QUESTIONS 1 & 2: 0
1. LITTLE INTEREST OR PLEASURE IN DOING THINGS: NOT AT ALL

## 2023-12-27 ENCOUNTER — OFFICE VISIT (OUTPATIENT)
Dept: FAMILY MEDICINE CLINIC | Facility: CLINIC | Age: 53
End: 2023-12-27
Payer: COMMERCIAL

## 2023-12-27 VITALS
HEART RATE: 80 BPM | WEIGHT: 293 LBS | DIASTOLIC BLOOD PRESSURE: 91 MMHG | HEIGHT: 61 IN | TEMPERATURE: 97.9 F | SYSTOLIC BLOOD PRESSURE: 144 MMHG | OXYGEN SATURATION: 97 % | RESPIRATION RATE: 16 BRPM | BODY MASS INDEX: 55.32 KG/M2

## 2023-12-27 DIAGNOSIS — F41.1 GAD (GENERALIZED ANXIETY DISORDER): ICD-10-CM

## 2023-12-27 DIAGNOSIS — E78.5 DYSLIPIDEMIA: ICD-10-CM

## 2023-12-27 DIAGNOSIS — N95.1 MENOPAUSAL STATE: ICD-10-CM

## 2023-12-27 DIAGNOSIS — R63.5 WEIGHT GAIN: ICD-10-CM

## 2023-12-27 DIAGNOSIS — K58.0 IRRITABLE BOWEL SYNDROME WITH DIARRHEA: ICD-10-CM

## 2023-12-27 DIAGNOSIS — M25.552 LEFT HIP PAIN: ICD-10-CM

## 2023-12-27 DIAGNOSIS — I10 ESSENTIAL HYPERTENSION: ICD-10-CM

## 2023-12-27 PROCEDURE — 3079F DIAST BP 80-89 MM HG: CPT | Performed by: FAMILY MEDICINE

## 2023-12-27 PROCEDURE — 99214 OFFICE O/P EST MOD 30 MIN: CPT | Performed by: FAMILY MEDICINE

## 2023-12-27 PROCEDURE — 3077F SYST BP >= 140 MM HG: CPT | Performed by: FAMILY MEDICINE

## 2023-12-27 RX ORDER — DICYCLOMINE HYDROCHLORIDE 10 MG/1
CAPSULE ORAL
Qty: 120 CAPSULE | Refills: 2 | Status: SHIPPED | OUTPATIENT
Start: 2023-12-27

## 2023-12-27 RX ORDER — BENZONATATE 100 MG/1
CAPSULE ORAL
COMMUNITY
Start: 2023-10-14

## 2023-12-27 RX ORDER — DICLOFENAC SODIUM 75 MG/1
TABLET, DELAYED RELEASE ORAL
Qty: 180 TABLET | Refills: 0 | Status: SHIPPED | OUTPATIENT
Start: 2023-12-27

## 2023-12-27 RX ORDER — ESTRADIOL 0.5 MG/1
TABLET ORAL
Qty: 90 TABLET | Refills: 1 | Status: SHIPPED | OUTPATIENT
Start: 2023-12-27

## 2023-12-27 RX ORDER — FLUOXETINE HYDROCHLORIDE 20 MG/1
20 CAPSULE ORAL DAILY
Qty: 90 CAPSULE | Refills: 1 | Status: SHIPPED | OUTPATIENT
Start: 2023-12-27

## 2023-12-27 RX ORDER — DOXYCYCLINE HYCLATE 100 MG
TABLET ORAL
COMMUNITY
Start: 2023-10-14

## 2023-12-27 RX ORDER — ATORVASTATIN CALCIUM 10 MG/1
TABLET, FILM COATED ORAL
Qty: 90 TABLET | Refills: 1 | Status: SHIPPED | OUTPATIENT
Start: 2023-12-27

## 2023-12-27 RX ORDER — FEXOFENADINE HCL 180 MG/1
180 TABLET ORAL DAILY
COMMUNITY
Start: 2023-10-14 | End: 2024-10-13

## 2023-12-27 RX ORDER — PHENDIMETRAZINE TARTRATE 35 MG/1
TABLET ORAL
Qty: 180 TABLET | Refills: 2 | Status: SHIPPED | OUTPATIENT
Start: 2023-12-27 | End: 2024-12-25

## 2023-12-27 RX ORDER — BISOPROLOL FUMARATE AND HYDROCHLOROTHIAZIDE 5; 6.25 MG/1; MG/1
TABLET ORAL
Qty: 90 TABLET | Refills: 1 | Status: SHIPPED | OUTPATIENT
Start: 2023-12-27

## 2023-12-27 NOTE — PROGRESS NOTES
HISTORY OF PRESENT ILLNESS  Chief Complaint   Patient presents with    Follow-up     Usually 130/ 80's    Will have to reschedule her mammo    Is getting a little exercise. occasionally resistance. Rowing machine and treadmill. Chasing after a new puppy. Has to stop exercising after 10 min because starts hurting in the left hip and it comes around to the inner left thigh    Subjective:   Tino Sanchez is a 47 y.o. female with hypertension. Hypertension ROS: taking medications as instructed, no medication side effects noted, no TIA's, no chest pain on exertion, no dyspnea on exertion, no swelling of ankles.    Key CAD CHF Meds            bisoprolol-hydroCHLOROthiazide (ZIAC) 5-6.25 MG per tablet (Taking)    Sig: TAKE 1 TABLET BY MOUTH EVERY NIGHT AT BEDTIME FOR BLOOD PRESSURE    atorvastatin (LIPITOR) 10 MG tablet (Taking)    Sig: TAKE 1 TABLET BY MOUTH DAILY           Lab Results   Component Value Date/Time     06/22/2023 10:34 AM    K 4.6 06/22/2023 10:34 AM     06/22/2023 10:34 AM    CO2 28 06/22/2023 10:34 AM    BUN 13 06/22/2023 10:34 AM    CREATININE 1.00 06/22/2023 10:34 AM    GLUCOSE 73 06/22/2023 10:34 AM    CALCIUM 9.4 06/22/2023 10:34 AM       Lab Results   Component Value Date    CREATININE 1.00 06/22/2023      Lab Results   Component Value Date    CHOL 151 06/22/2023    CHOL 145 06/22/2022    CHOL 153 04/12/2021     Lab Results   Component Value Date    TRIG 110 06/22/2023    TRIG 120 06/22/2022    TRIG 108 04/12/2021     Lab Results   Component Value Date    HDL 51 06/22/2023    HDL 48 06/22/2022    HDL 54 04/12/2021     Lab Results   Component Value Date    LDLCALC 78 06/22/2023    LDLCALC 73 06/22/2022    LDLCALC 79 04/12/2021     Lab Results   Component Value Date    LABVLDL 22 06/22/2023    LABVLDL 24 (H) 06/22/2022    VLDL 20 04/12/2021     Lab Results   Component Value Date    CHOLHDLRATIO 3.0 06/22/2023    CHOLHDLRATIO 3.0 06/22/2022      Lab Results   Component Value

## 2024-02-14 ENCOUNTER — E-VISIT (OUTPATIENT)
Dept: FAMILY MEDICINE CLINIC | Facility: CLINIC | Age: 54
End: 2024-02-14
Payer: COMMERCIAL

## 2024-02-14 DIAGNOSIS — H10.33 ACUTE BACTERIAL CONJUNCTIVITIS OF BOTH EYES: Primary | ICD-10-CM

## 2024-02-14 DIAGNOSIS — B96.89 ACUTE BACTERIAL SINUSITIS: ICD-10-CM

## 2024-02-14 DIAGNOSIS — J01.90 ACUTE BACTERIAL SINUSITIS: ICD-10-CM

## 2024-02-14 PROCEDURE — 99421 OL DIG E/M SVC 5-10 MIN: CPT | Performed by: FAMILY MEDICINE

## 2024-02-14 RX ORDER — AZITHROMYCIN 250 MG/1
250 TABLET, FILM COATED ORAL SEE ADMIN INSTRUCTIONS
Qty: 6 TABLET | Refills: 0 | Status: SHIPPED | OUTPATIENT
Start: 2024-02-14 | End: 2024-02-19

## 2024-02-14 RX ORDER — TOBRAMYCIN 3 MG/ML
1 SOLUTION/ DROPS OPHTHALMIC EVERY 4 HOURS
Qty: 1 EACH | Refills: 1 | Status: SHIPPED | OUTPATIENT
Start: 2024-02-14 | End: 2024-02-24

## 2024-02-14 NOTE — PROGRESS NOTES
Christianne Sanchez (1970) initiated an asynchronous digital communication through [employer].    HPI: per patient questionnaire     Exam: not applicable    Diagnoses and all orders for this visit:  Diagnoses and all orders for this visit:    Acute bacterial conjunctivitis of both eyes  -     tobramycin (TOBREX) 0.3 % ophthalmic solution; Place 1 drop into both eyes every 4 hours for 10 days    Acute bacterial sinusitis  -     azithromycin (ZITHROMAX) 250 MG tablet; Take 1 tablet by mouth See Admin Instructions for 5 days 500mg on day 1 followed by 250mg on days 2 - 5        A lot of times if it is both eyes we do worry more about an allergic problem.  Since you do wear contacts you are at higher risk of getting infection in your eyes.  It could be that you have a sinus problem this spilling over into your eyes.  I think I will try to treat your sinuses for an infection and give you eyedrops for infection as well.  If your eyes are not improving please see your eye doctor as soon as possible.  Warm compresses may help the eyes feel better.    Time: EV1 - 5-10 minutes were spent on the digital evaluation and management of this patient.    Destiney Le MD

## 2024-06-27 PROBLEM — E78.5 DYSLIPIDEMIA: Status: ACTIVE | Noted: 2024-06-27

## 2024-06-27 PROBLEM — I10 ESSENTIAL HYPERTENSION: Status: ACTIVE | Noted: 2024-06-27

## 2024-06-27 SDOH — ECONOMIC STABILITY: FOOD INSECURITY: WITHIN THE PAST 12 MONTHS, YOU WORRIED THAT YOUR FOOD WOULD RUN OUT BEFORE YOU GOT MONEY TO BUY MORE.: NEVER TRUE

## 2024-06-27 SDOH — ECONOMIC STABILITY: INCOME INSECURITY: HOW HARD IS IT FOR YOU TO PAY FOR THE VERY BASICS LIKE FOOD, HOUSING, MEDICAL CARE, AND HEATING?: NOT HARD AT ALL

## 2024-06-27 SDOH — ECONOMIC STABILITY: FOOD INSECURITY: WITHIN THE PAST 12 MONTHS, THE FOOD YOU BOUGHT JUST DIDN'T LAST AND YOU DIDN'T HAVE MONEY TO GET MORE.: NEVER TRUE

## 2024-06-27 ASSESSMENT — PATIENT HEALTH QUESTIONNAIRE - PHQ9
SUM OF ALL RESPONSES TO PHQ QUESTIONS 1-9: 0
SUM OF ALL RESPONSES TO PHQ9 QUESTIONS 1 & 2: 0
2. FEELING DOWN, DEPRESSED OR HOPELESS: NOT AT ALL
SUM OF ALL RESPONSES TO PHQ9 QUESTIONS 1 & 2: 0
SUM OF ALL RESPONSES TO PHQ QUESTIONS 1-9: 0
SUM OF ALL RESPONSES TO PHQ QUESTIONS 1-9: 0
1. LITTLE INTEREST OR PLEASURE IN DOING THINGS: NOT AT ALL
1. LITTLE INTEREST OR PLEASURE IN DOING THINGS: NOT AT ALL
2. FEELING DOWN, DEPRESSED OR HOPELESS: NOT AT ALL
SUM OF ALL RESPONSES TO PHQ QUESTIONS 1-9: 0

## 2024-06-27 ASSESSMENT — ENCOUNTER SYMPTOMS
SHORTNESS OF BREATH: 0
BLURRED VISION: 0
ORTHOPNEA: 0

## 2024-06-28 ENCOUNTER — OFFICE VISIT (OUTPATIENT)
Dept: FAMILY MEDICINE CLINIC | Facility: CLINIC | Age: 54
End: 2024-06-28
Payer: COMMERCIAL

## 2024-06-28 VITALS
TEMPERATURE: 98.1 F | HEART RATE: 55 BPM | WEIGHT: 293 LBS | DIASTOLIC BLOOD PRESSURE: 73 MMHG | RESPIRATION RATE: 16 BRPM | SYSTOLIC BLOOD PRESSURE: 117 MMHG | BODY MASS INDEX: 41.02 KG/M2 | OXYGEN SATURATION: 99 % | HEIGHT: 71 IN

## 2024-06-28 DIAGNOSIS — M53.3 PAIN OF LEFT SACROILIAC JOINT: ICD-10-CM

## 2024-06-28 DIAGNOSIS — I10 ESSENTIAL HYPERTENSION: ICD-10-CM

## 2024-06-28 DIAGNOSIS — N95.1 MENOPAUSAL STATE: ICD-10-CM

## 2024-06-28 DIAGNOSIS — E78.5 DYSLIPIDEMIA: ICD-10-CM

## 2024-06-28 DIAGNOSIS — E11.9 DIABETES MELLITUS WITHOUT COMPLICATION (HCC): ICD-10-CM

## 2024-06-28 DIAGNOSIS — M46.1 SACROILIITIS (HCC): ICD-10-CM

## 2024-06-28 DIAGNOSIS — E66.01 CLASS 3 SEVERE OBESITY WITH SERIOUS COMORBIDITY AND BODY MASS INDEX (BMI) OF 60.0 TO 69.9 IN ADULT, UNSPECIFIED OBESITY TYPE (HCC): ICD-10-CM

## 2024-06-28 DIAGNOSIS — E88.810 DYSMETABOLIC SYNDROME X: ICD-10-CM

## 2024-06-28 DIAGNOSIS — M25.552 LEFT HIP PAIN: ICD-10-CM

## 2024-06-28 DIAGNOSIS — R63.5 WEIGHT GAIN: ICD-10-CM

## 2024-06-28 DIAGNOSIS — I10 ESSENTIAL HYPERTENSION: Primary | ICD-10-CM

## 2024-06-28 DIAGNOSIS — F41.1 GAD (GENERALIZED ANXIETY DISORDER): ICD-10-CM

## 2024-06-28 LAB
ALBUMIN SERPL-MCNC: 4 G/DL (ref 3.5–5)
ALBUMIN/GLOB SERPL: 1.2 (ref 1–1.9)
ALP SERPL-CCNC: 95 U/L (ref 35–104)
ALT SERPL-CCNC: 25 U/L (ref 12–65)
ANION GAP SERPL CALC-SCNC: 10 MMOL/L (ref 9–18)
AST SERPL-CCNC: 27 U/L (ref 15–37)
BASOPHILS # BLD: 0.1 K/UL (ref 0–0.2)
BASOPHILS NFR BLD: 1 % (ref 0–2)
BILIRUB SERPL-MCNC: 0.5 MG/DL (ref 0–1.2)
BUN SERPL-MCNC: 7 MG/DL (ref 6–23)
CALCIUM SERPL-MCNC: 9.5 MG/DL (ref 8.8–10.2)
CHLORIDE SERPL-SCNC: 102 MMOL/L (ref 98–107)
CHOLEST SERPL-MCNC: 153 MG/DL (ref 0–200)
CO2 SERPL-SCNC: 27 MMOL/L (ref 20–28)
CREAT SERPL-MCNC: 0.9 MG/DL (ref 0.6–1.1)
CREAT UR-MCNC: 164 MG/DL (ref 28–217)
DIFFERENTIAL METHOD BLD: ABNORMAL
EOSINOPHIL # BLD: 0.2 K/UL (ref 0–0.8)
EOSINOPHIL NFR BLD: 2 % (ref 0.5–7.8)
ERYTHROCYTE [DISTWIDTH] IN BLOOD BY AUTOMATED COUNT: 11.9 % (ref 11.9–14.6)
ERYTHROCYTE [SEDIMENTATION RATE] IN BLOOD: 6 MM/HR (ref 0–30)
EST. AVERAGE GLUCOSE BLD GHB EST-MCNC: 119 MG/DL
GLOBULIN SER CALC-MCNC: 3.3 G/DL (ref 2.3–3.5)
GLUCOSE SERPL-MCNC: 101 MG/DL (ref 70–99)
HBA1C MFR BLD: 5.8 % (ref 0–5.6)
HCT VFR BLD AUTO: 45.3 % (ref 35.8–46.3)
HDLC SERPL-MCNC: 53 MG/DL (ref 40–60)
HDLC SERPL: 2.9 (ref 0–5)
HGB BLD-MCNC: 15.1 G/DL (ref 11.7–15.4)
IMM GRANULOCYTES # BLD AUTO: 0 K/UL (ref 0–0.5)
IMM GRANULOCYTES NFR BLD AUTO: 0 % (ref 0–5)
LDLC SERPL CALC-MCNC: 81 MG/DL (ref 0–100)
LYMPHOCYTES # BLD: 1.6 K/UL (ref 0.5–4.6)
LYMPHOCYTES NFR BLD: 22 % (ref 13–44)
MCH RBC QN AUTO: 33.9 PG (ref 26.1–32.9)
MCHC RBC AUTO-ENTMCNC: 33.3 G/DL (ref 31.4–35)
MCV RBC AUTO: 101.6 FL (ref 82–102)
MICROALBUMIN UR-MCNC: <1.2 MG/DL (ref 0–20)
MICROALBUMIN/CREAT UR-RTO: NORMAL MG/G (ref 0–30)
MONOCYTES # BLD: 0.6 K/UL (ref 0.1–1.3)
MONOCYTES NFR BLD: 9 % (ref 4–12)
NEUTS SEG # BLD: 4.7 K/UL (ref 1.7–8.2)
NEUTS SEG NFR BLD: 66 % (ref 43–78)
NRBC # BLD: 0 K/UL (ref 0–0.2)
PLATELET # BLD AUTO: 276 K/UL (ref 150–450)
PMV BLD AUTO: 11.3 FL (ref 9.4–12.3)
POTASSIUM SERPL-SCNC: 4.2 MMOL/L (ref 3.5–5.1)
PROT SERPL-MCNC: 7.2 G/DL (ref 6.3–8.2)
RBC # BLD AUTO: 4.46 M/UL (ref 4.05–5.2)
SODIUM SERPL-SCNC: 140 MMOL/L (ref 136–145)
TRIGL SERPL-MCNC: 95 MG/DL (ref 0–150)
TSH, 3RD GENERATION: 2.86 UIU/ML (ref 0.27–4.2)
VLDLC SERPL CALC-MCNC: 19 MG/DL (ref 6–23)
WBC # BLD AUTO: 7.1 K/UL (ref 4.3–11.1)

## 2024-06-28 PROCEDURE — 99214 OFFICE O/P EST MOD 30 MIN: CPT | Performed by: FAMILY MEDICINE

## 2024-06-28 PROCEDURE — 3074F SYST BP LT 130 MM HG: CPT | Performed by: FAMILY MEDICINE

## 2024-06-28 PROCEDURE — 3078F DIAST BP <80 MM HG: CPT | Performed by: FAMILY MEDICINE

## 2024-06-28 RX ORDER — ESTRADIOL 0.5 MG/1
TABLET ORAL
Qty: 90 TABLET | Refills: 1 | Status: SHIPPED | OUTPATIENT
Start: 2024-06-28

## 2024-06-28 RX ORDER — AMOXICILLIN 500 MG/1
500 CAPSULE ORAL 3 TIMES DAILY
COMMUNITY
Start: 2024-06-07

## 2024-06-28 RX ORDER — BISOPROLOL FUMARATE AND HYDROCHLOROTHIAZIDE 5; 6.25 MG/1; MG/1
TABLET ORAL
Qty: 90 TABLET | Refills: 1 | Status: SHIPPED | OUTPATIENT
Start: 2024-06-28

## 2024-06-28 RX ORDER — DICLOFENAC SODIUM 75 MG/1
TABLET, DELAYED RELEASE ORAL
Qty: 180 TABLET | Refills: 0 | Status: SHIPPED | OUTPATIENT
Start: 2024-06-28

## 2024-06-28 RX ORDER — FLUOXETINE HYDROCHLORIDE 20 MG/1
20 CAPSULE ORAL DAILY
Qty: 90 CAPSULE | Refills: 1 | Status: SHIPPED | OUTPATIENT
Start: 2024-06-28

## 2024-06-28 RX ORDER — PHENTERMINE HYDROCHLORIDE 37.5 MG/1
37.5 TABLET ORAL 2 TIMES DAILY
Qty: 30 TABLET | Refills: 2 | Status: SHIPPED | OUTPATIENT
Start: 2024-06-28 | End: 2024-09-26

## 2024-06-28 RX ORDER — ATORVASTATIN CALCIUM 10 MG/1
TABLET, FILM COATED ORAL
Qty: 90 TABLET | Refills: 1 | Status: SHIPPED | OUTPATIENT
Start: 2024-06-28

## 2024-06-28 ASSESSMENT — ENCOUNTER SYMPTOMS
CONSTIPATION: 0
RHINORRHEA: 0
DIARRHEA: 0
COUGH: 0
NAUSEA: 0
VOMITING: 0
WHEEZING: 0
ABDOMINAL PAIN: 0

## 2024-06-28 ASSESSMENT — ANXIETY QUESTIONNAIRES
4. TROUBLE RELAXING: NOT AT ALL
3. WORRYING TOO MUCH ABOUT DIFFERENT THINGS: NOT AT ALL
GAD7 TOTAL SCORE: 0
IF YOU CHECKED OFF ANY PROBLEMS ON THIS QUESTIONNAIRE, HOW DIFFICULT HAVE THESE PROBLEMS MADE IT FOR YOU TO DO YOUR WORK, TAKE CARE OF THINGS AT HOME, OR GET ALONG WITH OTHER PEOPLE: NOT DIFFICULT AT ALL
5. BEING SO RESTLESS THAT IT IS HARD TO SIT STILL: NOT AT ALL
1. FEELING NERVOUS, ANXIOUS, OR ON EDGE: NOT AT ALL
6. BECOMING EASILY ANNOYED OR IRRITABLE: NOT AT ALL
7. FEELING AFRAID AS IF SOMETHING AWFUL MIGHT HAPPEN: NOT AT ALL
2. NOT BEING ABLE TO STOP OR CONTROL WORRYING: NOT AT ALL

## 2024-06-28 NOTE — PROGRESS NOTES
HISTORY OF PRESENT ILLNESS  Chief Complaint   Patient presents with    Follow-up     6 mo follow up - Anxiety     NOTICE FOR THE PATIENT: This clinical note is not designed to be interpreted by patients.  We do not recommend reading it unless you have medical training. These notes may contain candid and (unintentionally) offensive descriptions, which are sometimes required for accurate documentation. If you would like more information about your healthcare, please obtain it directly by myself or my staff/colleagues - never solely from the notes. Thank you for your understanding and cooperation.       Less active last few months.   has had bilateral shoulder surgeries.    Left hip has bothered her a good bit lately.  Has started hurting almost every day.  Hurts deep in the left buttocks.  Will feel it click if turning and will click.  Fell on it several years ago.  Sharp sudden pain when it starts and will change to achy.  Does sit a lot and will use standing desk and then gets up and moves around.    At times the knees fee wobbly.  Had injections in the right knee at times.    Diabetes Mellitus: Patient presents for follow up of diabetes. Symptoms: no TIA's, no chest pain on exertion, no dyspnea on exertion, no swelling of ankles. Symptoms have reasonably well controlled.    Patient denies chest pain.  She states she is compliant most of the time with her  medications. She  states she is compliant most of the time with her  diet.  Evaluation to date has been included below.   Home sugars: BGs consistently in an acceptable range. Treatment to date: medications.  Diabetic issues reviewed with her: low cholesterol diet, weight control and daily exercise discussed.     Cardiac Medications       Antihypertensive Combinations       bisoprolol-hydroCHLOROthiazide (ZIAC) 5-6.25 MG per tablet TAKE 1 TABLET BY MOUTH EVERY NIGHT AT BEDTIME FOR BLOOD PRESSURE       HMG CoA Reductase Inhibitors       atorvastatin

## 2024-06-30 LAB — CRP SERPL-MCNC: 7 MG/L (ref 0–10)

## 2024-08-02 ENCOUNTER — TRANSCRIBE ORDERS (OUTPATIENT)
Dept: SCHEDULING | Age: 54
End: 2024-08-02

## 2024-08-02 DIAGNOSIS — Z12.31 SCREENING MAMMOGRAM FOR HIGH-RISK PATIENT: Primary | ICD-10-CM

## 2024-09-19 ENCOUNTER — HOSPITAL ENCOUNTER (OUTPATIENT)
Dept: MAMMOGRAPHY | Age: 54
Discharge: HOME | End: 2024-09-22
Payer: COMMERCIAL

## 2024-09-19 DIAGNOSIS — Z12.31 SCREENING MAMMOGRAM FOR HIGH-RISK PATIENT: ICD-10-CM

## 2024-09-19 PROCEDURE — 77063 BREAST TOMOSYNTHESIS BI: CPT

## 2024-12-30 ASSESSMENT — ENCOUNTER SYMPTOMS
ORTHOPNEA: 0
SHORTNESS OF BREATH: 0
BLURRED VISION: 0

## 2024-12-30 NOTE — PROGRESS NOTES
necessary.          Her other chronic conditions are stable at this time.  She continues to be followed by her previous specialists for the above chronic issues.  She is stable on the current treatment and tolerating it well.  No significant sides effects.  Will not adjust therapy at this time, unless noted above.   We will continue to monitor for any problems.  Medications refilled and lab work has been ordered where needed.  Reviewed medications are explained including any potential interactions or side effects in detail. The patient's questions were answered.  She  understands the above and has no further questions.    Further workup and treatment should be done if symptoms persist, worsen or new symptoms occur. She  will call to notify us of any problems, complications or worsening symptoms.    Follow-up and Dispositions    Return in about 6 months (around 6/30/2025).         The following additional handouts were provided to patient:    none    The patient (or guardian, if applicable) and other individuals in attendance with the patient were advised that Artificial Intelligence will be utilized during this visit to record, process the conversation to generate a clinical note, and support improvement of the AI technology. The patient (or guardian, if applicable) and other individuals in attendance at the appointment consented to the use of AI, including the recording.      (Some details in this note may have been created with speech-recognition software.  Some errors in speech recognition may have occurred.   Most of those have been corrected but some may have been missed.)         Destiney Le MD  [facility] [place] [id number]  Phone [phone]

## 2024-12-31 ENCOUNTER — PATIENT MESSAGE (OUTPATIENT)
Dept: FAMILY MEDICINE CLINIC | Facility: CLINIC | Age: 54
End: 2024-12-31

## 2024-12-31 ENCOUNTER — OFFICE VISIT (OUTPATIENT)
Dept: FAMILY MEDICINE CLINIC | Facility: CLINIC | Age: 54
End: 2024-12-31
Payer: COMMERCIAL

## 2024-12-31 VITALS
DIASTOLIC BLOOD PRESSURE: 75 MMHG | HEART RATE: 54 BPM | WEIGHT: 293 LBS | OXYGEN SATURATION: 100 % | TEMPERATURE: 97.2 F | RESPIRATION RATE: 16 BRPM | BODY MASS INDEX: 41.02 KG/M2 | SYSTOLIC BLOOD PRESSURE: 135 MMHG | HEIGHT: 71 IN

## 2024-12-31 DIAGNOSIS — M25.552 LEFT HIP PAIN: ICD-10-CM

## 2024-12-31 DIAGNOSIS — F41.1 GAD (GENERALIZED ANXIETY DISORDER): ICD-10-CM

## 2024-12-31 DIAGNOSIS — E66.01 CLASS 3 SEVERE OBESITY WITH SERIOUS COMORBIDITY AND BODY MASS INDEX (BMI) OF 60.0 TO 69.9 IN ADULT, UNSPECIFIED OBESITY TYPE: ICD-10-CM

## 2024-12-31 DIAGNOSIS — E66.813 CLASS 3 SEVERE OBESITY WITH SERIOUS COMORBIDITY AND BODY MASS INDEX (BMI) OF 60.0 TO 69.9 IN ADULT, UNSPECIFIED OBESITY TYPE: ICD-10-CM

## 2024-12-31 DIAGNOSIS — K58.0 IRRITABLE BOWEL SYNDROME WITH DIARRHEA: ICD-10-CM

## 2024-12-31 DIAGNOSIS — E78.5 DYSLIPIDEMIA: ICD-10-CM

## 2024-12-31 DIAGNOSIS — N95.1 MENOPAUSAL STATE: ICD-10-CM

## 2024-12-31 DIAGNOSIS — I10 ESSENTIAL HYPERTENSION: Primary | ICD-10-CM

## 2024-12-31 PROCEDURE — 3075F SYST BP GE 130 - 139MM HG: CPT | Performed by: FAMILY MEDICINE

## 2024-12-31 PROCEDURE — 99214 OFFICE O/P EST MOD 30 MIN: CPT | Performed by: FAMILY MEDICINE

## 2024-12-31 PROCEDURE — 3078F DIAST BP <80 MM HG: CPT | Performed by: FAMILY MEDICINE

## 2024-12-31 RX ORDER — DICLOFENAC SODIUM 75 MG/1
TABLET, DELAYED RELEASE ORAL
Qty: 180 TABLET | Refills: 0 | Status: SHIPPED | OUTPATIENT
Start: 2024-12-31

## 2024-12-31 RX ORDER — BISOPROLOL FUMARATE AND HYDROCHLOROTHIAZIDE 5; 6.25 MG/1; MG/1
TABLET ORAL
Qty: 90 TABLET | Refills: 1 | Status: SHIPPED | OUTPATIENT
Start: 2024-12-31

## 2024-12-31 RX ORDER — PHENTERMINE HYDROCHLORIDE 37.5 MG/1
37.5 TABLET ORAL 2 TIMES DAILY
Qty: 60 TABLET | Refills: 2 | Status: SHIPPED | OUTPATIENT
Start: 2024-12-31 | End: 2025-03-31

## 2024-12-31 RX ORDER — DICYCLOMINE HYDROCHLORIDE 10 MG/1
CAPSULE ORAL
Qty: 120 CAPSULE | Refills: 2 | Status: SHIPPED | OUTPATIENT
Start: 2024-12-31

## 2024-12-31 RX ORDER — ESTRADIOL 0.5 MG/1
TABLET ORAL
Qty: 90 TABLET | Refills: 1 | Status: SHIPPED | OUTPATIENT
Start: 2024-12-31

## 2024-12-31 RX ORDER — ATORVASTATIN CALCIUM 10 MG/1
TABLET, FILM COATED ORAL
Qty: 90 TABLET | Refills: 1 | Status: SHIPPED | OUTPATIENT
Start: 2024-12-31

## 2024-12-31 ASSESSMENT — PATIENT HEALTH QUESTIONNAIRE - PHQ9
SUM OF ALL RESPONSES TO PHQ QUESTIONS 1-9: 0
2. FEELING DOWN, DEPRESSED OR HOPELESS: NOT AT ALL
SUM OF ALL RESPONSES TO PHQ QUESTIONS 1-9: 0
SUM OF ALL RESPONSES TO PHQ QUESTIONS 1-9: 0
SUM OF ALL RESPONSES TO PHQ9 QUESTIONS 1 & 2: 0
SUM OF ALL RESPONSES TO PHQ QUESTIONS 1-9: 0
1. LITTLE INTEREST OR PLEASURE IN DOING THINGS: NOT AT ALL

## 2024-12-31 ASSESSMENT — ENCOUNTER SYMPTOMS
WHEEZING: 0
DIARRHEA: 0
CONSTIPATION: 0
RHINORRHEA: 0
NAUSEA: 0
ABDOMINAL PAIN: 0
COUGH: 0
VOMITING: 0

## 2025-02-19 ENCOUNTER — PATIENT MESSAGE (OUTPATIENT)
Dept: FAMILY MEDICINE CLINIC | Facility: CLINIC | Age: 55
End: 2025-02-19

## 2025-02-20 ASSESSMENT — PATIENT HEALTH QUESTIONNAIRE - PHQ9
2. FEELING DOWN, DEPRESSED OR HOPELESS: NOT AT ALL
SUM OF ALL RESPONSES TO PHQ9 QUESTIONS 1 & 2: 0
SUM OF ALL RESPONSES TO PHQ QUESTIONS 1-9: 0
1. LITTLE INTEREST OR PLEASURE IN DOING THINGS: NOT AT ALL
1. LITTLE INTEREST OR PLEASURE IN DOING THINGS: NOT AT ALL
2. FEELING DOWN, DEPRESSED OR HOPELESS: NOT AT ALL
SUM OF ALL RESPONSES TO PHQ QUESTIONS 1-9: 0

## 2025-02-25 ENCOUNTER — OFFICE VISIT (OUTPATIENT)
Dept: FAMILY MEDICINE CLINIC | Facility: CLINIC | Age: 55
End: 2025-02-25
Payer: COMMERCIAL

## 2025-02-25 VITALS
DIASTOLIC BLOOD PRESSURE: 75 MMHG | HEIGHT: 71 IN | OXYGEN SATURATION: 96 % | BODY MASS INDEX: 41.02 KG/M2 | TEMPERATURE: 97.2 F | WEIGHT: 293 LBS | HEART RATE: 72 BPM | SYSTOLIC BLOOD PRESSURE: 155 MMHG | RESPIRATION RATE: 16 BRPM

## 2025-02-25 DIAGNOSIS — E78.5 DYSLIPIDEMIA: ICD-10-CM

## 2025-02-25 DIAGNOSIS — R06.09 DOE (DYSPNEA ON EXERTION): ICD-10-CM

## 2025-02-25 DIAGNOSIS — E66.813 CLASS 3 SEVERE OBESITY WITH SERIOUS COMORBIDITY AND BODY MASS INDEX (BMI) OF 60.0 TO 69.9 IN ADULT, UNSPECIFIED OBESITY TYPE: ICD-10-CM

## 2025-02-25 DIAGNOSIS — E66.01 CLASS 3 SEVERE OBESITY WITH SERIOUS COMORBIDITY AND BODY MASS INDEX (BMI) OF 60.0 TO 69.9 IN ADULT, UNSPECIFIED OBESITY TYPE: ICD-10-CM

## 2025-02-25 DIAGNOSIS — I10 ESSENTIAL HYPERTENSION: ICD-10-CM

## 2025-02-25 DIAGNOSIS — E11.9 DIABETES MELLITUS WITHOUT COMPLICATION (HCC): Primary | ICD-10-CM

## 2025-02-25 DIAGNOSIS — M25.552 LEFT HIP PAIN: ICD-10-CM

## 2025-02-25 DIAGNOSIS — E11.9 DIABETES MELLITUS WITHOUT COMPLICATION (HCC): ICD-10-CM

## 2025-02-25 LAB
ANION GAP SERPL CALC-SCNC: 10 MMOL/L (ref 7–16)
BUN SERPL-MCNC: 10 MG/DL (ref 6–23)
CALCIUM SERPL-MCNC: 9.4 MG/DL (ref 8.8–10.2)
CHLORIDE SERPL-SCNC: 104 MMOL/L (ref 98–107)
CO2 SERPL-SCNC: 27 MMOL/L (ref 20–29)
CREAT SERPL-MCNC: 0.82 MG/DL (ref 0.6–1.1)
EST. AVERAGE GLUCOSE BLD GHB EST-MCNC: 116 MG/DL
GLUCOSE SERPL-MCNC: 94 MG/DL (ref 70–99)
HBA1C MFR BLD: 5.7 % (ref 0–5.6)
NT PRO BNP: 590 PG/ML (ref 0–125)
POTASSIUM SERPL-SCNC: 4.4 MMOL/L (ref 3.5–5.1)
SODIUM SERPL-SCNC: 141 MMOL/L (ref 136–145)

## 2025-02-25 PROCEDURE — 3044F HG A1C LEVEL LT 7.0%: CPT | Performed by: FAMILY MEDICINE

## 2025-02-25 PROCEDURE — 3077F SYST BP >= 140 MM HG: CPT | Performed by: FAMILY MEDICINE

## 2025-02-25 PROCEDURE — 99214 OFFICE O/P EST MOD 30 MIN: CPT | Performed by: FAMILY MEDICINE

## 2025-02-25 PROCEDURE — 3078F DIAST BP <80 MM HG: CPT | Performed by: FAMILY MEDICINE

## 2025-02-25 RX ORDER — DICLOFENAC SODIUM 75 MG/1
TABLET, DELAYED RELEASE ORAL
Qty: 180 TABLET | Refills: 0 | Status: SHIPPED | OUTPATIENT
Start: 2025-02-25

## 2025-02-25 SDOH — ECONOMIC STABILITY: FOOD INSECURITY: WITHIN THE PAST 12 MONTHS, THE FOOD YOU BOUGHT JUST DIDN'T LAST AND YOU DIDN'T HAVE MONEY TO GET MORE.: NEVER TRUE

## 2025-02-25 SDOH — ECONOMIC STABILITY: FOOD INSECURITY: WITHIN THE PAST 12 MONTHS, YOU WORRIED THAT YOUR FOOD WOULD RUN OUT BEFORE YOU GOT MONEY TO BUY MORE.: NEVER TRUE

## 2025-02-25 NOTE — PROGRESS NOTES
(dyspnea on exertion)  -     Brain Natriuretic Peptide; Future         Reviewed medications and side effects in detail    Assessment & Plan  1. Weight gain.  Her weight gain is concerning despite her efforts in maintaining a healthy lifestyle. She reports eating better, drinking more water, and exercising regularly. She experiences shortness of breath during physical activities such as walking, unloading groceries, and doing household chores. This is noted as a concern given her weight gain and potential strain on her heart. She was advised to engage in 300 minutes of moderate-intensity exercise per week, which can be broken down into multiple 10-minute sessions throughout the week. She was also advised to reduce her meal portions by half to prevent nausea associated with overeating. A prescription for Zepbound 2.5 mg once weekly was provided, pending insurance approval. If not covered, a compound pharmacy may be an alternative. Laboratory tests were ordered to assess her A1c levels and cardiac enzymes.    2. Hypertension.  Her blood pressure was slightly elevated during this visit. A recheck of her blood pressure will be conducted before she leaves the clinic today.    3. Diabetes Mellitus.  Her blood sugar levels have been stable, with occasional spot checks showing readings around 90. Laboratory tests were ordered to assess her A1c levels.    4. Arthritis.  She takes diclofenac as needed for arthritis, particularly in her left hip and right knee. When her right knee swells and tightens, she takes diclofenac for a couple of days and applies ice until the inflammation subsides.          Her other chronic conditions are stable at this time.  She continues to be followed by her previous specialists for the above chronic issues.  She is stable on the current treatment and tolerating it well.  No significant sides effects.  Will not adjust therapy at this time, unless noted above.   We will continue to monitor for any

## 2025-02-26 DIAGNOSIS — I10 ESSENTIAL HYPERTENSION: Primary | ICD-10-CM

## 2025-02-26 RX ORDER — BISOPROLOL FUMARATE AND HYDROCHLOROTHIAZIDE 10; 6.25 MG/1; MG/1
1 TABLET ORAL DAILY
Qty: 90 TABLET | Refills: 1 | Status: SHIPPED | OUTPATIENT
Start: 2025-02-26

## 2025-02-26 NOTE — PROGRESS NOTES
Prescription (s) refilled  It (they) has been escribed to the pharmacy.    Requested Prescriptions     Signed Prescriptions Disp Refills    bisoprolol-hydroCHLOROthiazide (ZIAC) 10-6.25 MG per tablet 90 tablet 1     Sig: Take 1 tablet by mouth daily     No orders of the defined types were placed in this encounter.          ICD-10-CM    1. Essential hypertension  I10 bisoprolol-hydroCHLOROthiazide (ZIAC) 10-6.25 MG per tablet

## 2025-02-26 NOTE — RESULT ENCOUNTER NOTE
Your BNP test which responds to strain on the heart is elevated.  Go ahead and double up on your blood pressure medicine to take 2 each day.  I am sending a higher dose to your mail order pharmacy.  If your blood pressure is not improving please let me know before your next appointment.  Your A1c is in the prediabetic range. That means your average blood sugar is elevated.  That puts you at higher risk for heart disease. The American College of cardiology guidelines stress the importance of lifestyle modification as the foundation to lowering your cardiovascular disease risk.  This includes eating a heart healthy nutritious diet, regular aerobic physical activity maintenance of desired body weight and avoidance of tobacco products.  Continue your current medications.  Recheck labs with an appointment in 3-6 months. Other labs are normal.

## 2025-03-02 ENCOUNTER — PATIENT MESSAGE (OUTPATIENT)
Dept: FAMILY MEDICINE CLINIC | Facility: CLINIC | Age: 55
End: 2025-03-02

## 2025-03-02 DIAGNOSIS — E66.813 CLASS 3 SEVERE OBESITY WITH SERIOUS COMORBIDITY AND BODY MASS INDEX (BMI) OF 60.0 TO 69.9 IN ADULT, UNSPECIFIED OBESITY TYPE: Primary | ICD-10-CM

## 2025-03-02 DIAGNOSIS — E66.01 CLASS 3 SEVERE OBESITY WITH SERIOUS COMORBIDITY AND BODY MASS INDEX (BMI) OF 60.0 TO 69.9 IN ADULT, UNSPECIFIED OBESITY TYPE: Primary | ICD-10-CM

## 2025-03-04 ENCOUNTER — TELEPHONE (OUTPATIENT)
Dept: FAMILY MEDICINE CLINIC | Facility: CLINIC | Age: 55
End: 2025-03-04

## 2025-03-04 NOTE — TELEPHONE ENCOUNTER
Prescription (s) refilled  It (they) has been escribed to the pharmacy.    Requested Prescriptions     Signed Prescriptions Disp Refills    Semaglutide,0.25 or 0.5MG/DOS, 2 MG/1.5ML SOPN 4 Adjustable Dose Pre-filled Pen Syringe 1     Sig: Give 0.25 mg SQ in abdomen, thigh, or upper arm; rotate inj sites Q week for 4 weeks then increase to 0.5 mg qweek     Authorizing Provider: MENG RODARTE    Semaglutide, 1 MG/DOSE, (OZEMPIC) 4 MG/3ML SOPN sc injection 3 mL 0     Sig: Inject 1 mg into the skin every 7 days After finishing the 0.5 mg dose     Authorizing Provider: MENG RODARTE     No orders of the defined types were placed in this encounter.          ICD-10-CM    1. Class 3 severe obesity with serious comorbidity and body mass index (BMI) of 60.0 to 69.9 in adult, unspecified obesity type  E66.813 Semaglutide,0.25 or 0.5MG/DOS, 2 MG/1.5ML SOPN    Z68.44 Semaglutide, 1 MG/DOSE, (OZEMPIC) 4 MG/3ML SOPN sc injection    E66.01

## 2025-05-27 ENCOUNTER — OFFICE VISIT (OUTPATIENT)
Dept: FAMILY MEDICINE CLINIC | Facility: CLINIC | Age: 55
End: 2025-05-27
Payer: COMMERCIAL

## 2025-05-27 VITALS
WEIGHT: 293 LBS | OXYGEN SATURATION: 99 % | TEMPERATURE: 97.2 F | BODY MASS INDEX: 41.02 KG/M2 | HEART RATE: 62 BPM | SYSTOLIC BLOOD PRESSURE: 127 MMHG | DIASTOLIC BLOOD PRESSURE: 87 MMHG | RESPIRATION RATE: 18 BRPM | HEIGHT: 71 IN

## 2025-05-27 DIAGNOSIS — E78.5 DYSLIPIDEMIA: ICD-10-CM

## 2025-05-27 DIAGNOSIS — F41.1 GAD (GENERALIZED ANXIETY DISORDER): ICD-10-CM

## 2025-05-27 DIAGNOSIS — M25.552 LEFT HIP PAIN: ICD-10-CM

## 2025-05-27 DIAGNOSIS — N95.1 MENOPAUSAL STATE: ICD-10-CM

## 2025-05-27 DIAGNOSIS — I10 ESSENTIAL HYPERTENSION: ICD-10-CM

## 2025-05-27 DIAGNOSIS — E11.9 DIABETES MELLITUS WITHOUT COMPLICATION (HCC): Primary | ICD-10-CM

## 2025-05-27 PROCEDURE — 3074F SYST BP LT 130 MM HG: CPT | Performed by: FAMILY MEDICINE

## 2025-05-27 PROCEDURE — 3044F HG A1C LEVEL LT 7.0%: CPT | Performed by: FAMILY MEDICINE

## 2025-05-27 PROCEDURE — 3079F DIAST BP 80-89 MM HG: CPT | Performed by: FAMILY MEDICINE

## 2025-05-27 PROCEDURE — 99214 OFFICE O/P EST MOD 30 MIN: CPT | Performed by: FAMILY MEDICINE

## 2025-05-27 RX ORDER — BISOPROLOL FUMARATE AND HYDROCHLOROTHIAZIDE 10; 6.25 MG/1; MG/1
1 TABLET ORAL DAILY
Qty: 90 TABLET | Refills: 2 | Status: SHIPPED | OUTPATIENT
Start: 2025-05-27

## 2025-05-27 RX ORDER — DICLOFENAC SODIUM 75 MG/1
TABLET, DELAYED RELEASE ORAL
Qty: 180 TABLET | Refills: 0 | Status: SHIPPED | OUTPATIENT
Start: 2025-05-27

## 2025-05-27 RX ORDER — ATORVASTATIN CALCIUM 10 MG/1
TABLET, FILM COATED ORAL
Qty: 90 TABLET | Refills: 2 | Status: SHIPPED | OUTPATIENT
Start: 2025-05-27

## 2025-05-27 RX ORDER — ESTRADIOL 0.5 MG/1
TABLET ORAL
Qty: 90 TABLET | Refills: 2 | Status: SHIPPED | OUTPATIENT
Start: 2025-05-27

## 2025-05-27 NOTE — PROGRESS NOTES
as needed for pain management.  - No refill needed for the inhaler at this time.    Follow-up  - Follow up in 6 months unless needed sooner.          Her other chronic conditions are stable at this time.  She continues to be followed by her previous specialists for the above chronic issues.  She is stable on the current treatment and tolerating it well.  No significant sides effects.  Will not adjust therapy at this time, unless noted above.   We will continue to monitor for any problems.  Medications refilled and lab work has been ordered where needed.  Reviewed medications are explained including any potential interactions or side effects in detail. The patient's questions were answered.  She  understands the above and has no further questions.    Further workup and treatment should be done if symptoms persist, worsen or new symptoms occur. She  will call to notify us of any problems, complications or worsening symptoms.    Follow-up and Dispositions    Return in about 6 months (around 11/27/2025).         The following additional handouts were provided to patient:    none    The patient (or guardian, if applicable) and other individuals in attendance with the patient were advised that Artificial Intelligence will be utilized during this visit to record, process the conversation to generate a clinical note, and support improvement of the AI technology. The patient (or guardian, if applicable) and other individuals in attendance at the appointment consented to the use of AI, including the recording.      (Some details in this note may have been created with speech-recognition software.  Some errors in speech recognition may have occurred.   Most of those have been corrected but some may have been missed.)         Destiney Le MD  [facility] [place] [address]  Phone [phone]

## 2025-05-28 ENCOUNTER — TELEPHONE (OUTPATIENT)
Dept: FAMILY MEDICINE CLINIC | Facility: CLINIC | Age: 55
End: 2025-05-28

## 2025-05-28 NOTE — TELEPHONE ENCOUNTER
Forms received by fax from [employer] for a prior authorization on [employer]. Placed in clinical staff tray in front office for Dr Le.

## 2025-06-09 ASSESSMENT — ENCOUNTER SYMPTOMS
RHINORRHEA: 0
ABDOMINAL PAIN: 0
COUGH: 0
NAUSEA: 0
SHORTNESS OF BREATH: 0
VOMITING: 0
WHEEZING: 0
DIARRHEA: 0
CONSTIPATION: 0

## (undated) DEVICE — BUTTON SWITCH PENCIL BLADE ELECTRODE, HOLSTER: Brand: EDGE

## (undated) DEVICE — 3M™ TEGADERM™ TRANSPARENT FILM DRESSING FRAME STYLE, 1628, 6 IN X 8 IN (15 CM X 20 CM), 10/CT 8CT/CASE: Brand: 3M™ TEGADERM™

## (undated) DEVICE — SUTURE VCRL SZ 3-0 L18IN ABSRB UD L26MM SH 1/2 CIR J864D

## (undated) DEVICE — REM POLYHESIVE ADULT PATIENT RETURN ELECTRODE: Brand: VALLEYLAB

## (undated) DEVICE — TRAY PREP DRY W/ PREM GLV 2 APPL 6 SPNG 2 UNDPD 1 OVERWRAP

## (undated) DEVICE — SOLUTION IV 1000ML 0.9% SOD CHL

## (undated) DEVICE — SLIM BODY SKIN STAPLER: Brand: APPOSE ULC

## (undated) DEVICE — 2000CC GUARDIAN II: Brand: GUARDIAN

## (undated) DEVICE — SURGICAL PROCEDURE PACK BASIC ST FRANCIS

## (undated) DEVICE — KENDALL SCD EXPRESS SLEEVES, KNEE LENGTH, LARGE: Brand: KENDALL SCD

## (undated) DEVICE — AMD ANTIMICROBIAL NON-ADHERENT PAD,0.2% POLYHEXAMETHYLENE BIGUANIDE HCI (PHMB): Brand: TELFA

## (undated) DEVICE — SHEET, DRAPE, SPLIT, STERILE: Brand: MEDLINE

## (undated) DEVICE — DRAPE TWL SURG 16X26IN BLU ORB04] ALLCARE INC]